# Patient Record
Sex: FEMALE | Race: WHITE | NOT HISPANIC OR LATINO | Employment: UNEMPLOYED | URBAN - METROPOLITAN AREA
[De-identification: names, ages, dates, MRNs, and addresses within clinical notes are randomized per-mention and may not be internally consistent; named-entity substitution may affect disease eponyms.]

---

## 2017-03-24 ENCOUNTER — HOSPITAL ENCOUNTER (EMERGENCY)
Facility: HOSPITAL | Age: 37
Discharge: HOME | End: 2017-03-24
Attending: EMERGENCY MEDICINE | Admitting: EMERGENCY MEDICINE

## 2017-03-24 ENCOUNTER — APPOINTMENT (OUTPATIENT)
Dept: GENERAL RADIOLOGY | Facility: HOSPITAL | Age: 37
End: 2017-03-24

## 2017-03-24 VITALS
WEIGHT: 141 LBS | SYSTOLIC BLOOD PRESSURE: 126 MMHG | OXYGEN SATURATION: 100 % | RESPIRATION RATE: 18 BRPM | HEIGHT: 60 IN | TEMPERATURE: 98.2 F | HEART RATE: 96 BPM | BODY MASS INDEX: 27.68 KG/M2 | DIASTOLIC BLOOD PRESSURE: 90 MMHG

## 2017-03-24 DIAGNOSIS — S90.32XA CONTUSION OF LEFT FOOT, INITIAL ENCOUNTER: Primary | ICD-10-CM

## 2017-03-24 PROCEDURE — 99282 EMERGENCY DEPT VISIT SF MDM: CPT | Performed by: EMERGENCY MEDICINE

## 2017-03-24 PROCEDURE — 99283 EMERGENCY DEPT VISIT LOW MDM: CPT

## 2017-03-24 PROCEDURE — 73630 X-RAY EXAM OF FOOT: CPT

## 2017-03-24 RX ORDER — NAPROXEN 375 MG/1
375 TABLET ORAL ONCE
Status: COMPLETED | OUTPATIENT
Start: 2017-03-24 | End: 2017-03-24

## 2017-03-24 RX ORDER — NAPROXEN 500 MG/1
500 TABLET ORAL 2 TIMES DAILY PRN
Qty: 30 TABLET | Refills: 0 | Status: SHIPPED | OUTPATIENT
Start: 2017-03-24 | End: 2018-05-17

## 2017-03-24 RX ADMIN — NAPROXEN 375 MG: 375 TABLET ORAL at 18:54

## 2017-03-24 NOTE — ED PROVIDER NOTES
Subjective   History of Present Illness  History of Present Illness    Chief complaint: Foot injury    Location: Left foot    Quality/Severity:  Moderate, sharp pain    Timing/Onset/Duration: Acute onset just prior to arrival    Modifying Factors: It hurts to press on the area that is sore, feels better to not    Associated Symptoms: Patient denies any numbness, tingling, or weakness, there is no abrasions or lacerations    Narrative: As 36-year-old white female had a lawn more roll off the tailgate of a truck and landed on her left foot just prior to arrival.  The patient presents pointing left foot pain.  There is no numbness, tingling, weakness, or change in color or temperature.    PCP: No known provider      Review of Systems   Musculoskeletal:        Left foot pain   Neurological: Negative for weakness and numbness.        Medication List      ASK your doctor about these medications          HYDROcodone-acetaminophen 5-325 MG per tablet   Commonly known as:  NORCO   Take 1 tablet by mouth Every 6 (Six) Hours As Needed for moderate pain   (4-6) for up to 15 doses.       promethazine 25 MG tablet   Commonly known as:  PHENERGAN   Take 1 tablet by mouth Every 6 (Six) Hours As Needed for nausea or   vomiting for up to 12 doses.           History reviewed. No pertinent past medical history.    No Known Allergies    Past Surgical History:   Procedure Laterality Date   • APPENDECTOMY     • DILATATION AND CURETTAGE     • OOPHORECTOMY         History reviewed. No pertinent family history.    Social History     Social History   • Marital status: Single     Spouse name: N/A   • Number of children: N/A   • Years of education: N/A     Social History Main Topics   • Smoking status: Current Every Day Smoker     Packs/day: 1.00   • Smokeless tobacco: None   • Alcohol use No   • Drug use: No   • Sexual activity: Not Asked     Other Topics Concern   • None     Social History Narrative   • None           Objective   Physical  Exam   Constitutional: She appears well-developed and well-nourished.   ED Triage Vitals:  Temp: 98.2 °F (36.8 °C) (03/24/17 1753)  Heart Rate: 96 (03/24/17 1753)  Resp: 18 (03/24/17 1753)  BP: 126/90 (03/24/17 1753)  SpO2: 100 % (03/24/17 1753)  Temp src: Oral (03/24/17 1753)  Heart Rate Source: n/a  Patient Position: n/a  BP Location: n/a  FiO2 (%): n/a    The patient's vitals were reviewed by me.  Unless otherwise noted they are within normal limits.     Musculoskeletal:   There is tenderness and a small contusion noted on the dorsal aspect of the left foot.  The capillary refill is less than 2 seconds.  The sensation is intact.  There is a normal range of motion no motion noted.  There is no joint laxity noted.  There is a 2 posterior cells pedis and posterior tibial pulse.  There are no lacerations or abrasions.   Neurological: She is alert.   Nursing note and vitals reviewed.      Procedures         ED Course  ED Course      6:40 PM, 03/24/17:  Patient's diagnosis of foot contusion was discussed with her.  She has been advised to ice the affected area for 20 minutes every 2 hours while she is awake for 2-3 days.  She's been advised to elevate the foot.  She has been given a follow-up appointment with a podiatrist to follow-up with in one week if she is not completely better.  She has been instructed to return if she has increasing pain, numbness, tingling, weakness, change in color or temperature, worse in any way at all.  Proper something for the pain.  All of the patient's questions were answered she will be discharged in good condition.            MDM  XR Foot 3+ View Left    (Results Pending)     Labs Reviewed - No data to display  No results found.    Final diagnoses:   None         ED Medications:  Medications - No data to display    New Medications:     Medication List      ASK your doctor about these medications          HYDROcodone-acetaminophen 5-325 MG per tablet   Commonly known as:  NORCO   Take 1  tablet by mouth Every 6 (Six) Hours As Needed for moderate pain   (4-6) for up to 15 doses.       promethazine 25 MG tablet   Commonly known as:  PHENERGAN   Take 1 tablet by mouth Every 6 (Six) Hours As Needed for nausea or   vomiting for up to 12 doses.           Stopped Medications:     Medication List      ASK your doctor about these medications          HYDROcodone-acetaminophen 5-325 MG per tablet   Commonly known as:  NORCO   Take 1 tablet by mouth Every 6 (Six) Hours As Needed for moderate pain   (4-6) for up to 15 doses.       promethazine 25 MG tablet   Commonly known as:  PHENERGAN   Take 1 tablet by mouth Every 6 (Six) Hours As Needed for nausea or   vomiting for up to 12 doses.             Final diagnoses:   Contusion of left foot, initial encounter            Isaiah Doan MD  03/24/17 5220

## 2017-03-24 NOTE — DISCHARGE INSTRUCTIONS
Called Dr. Salguero for follow-up appointment in 1 week if not completely better.  Return to the emergency department if you have increasing pain, numbness, tingling, weakness, change in color or temperature, worse in any way at all.

## 2018-05-17 ENCOUNTER — APPOINTMENT (OUTPATIENT)
Dept: GENERAL RADIOLOGY | Facility: HOSPITAL | Age: 38
End: 2018-05-17

## 2018-05-17 ENCOUNTER — HOSPITAL ENCOUNTER (EMERGENCY)
Facility: HOSPITAL | Age: 38
Discharge: HOME | End: 2018-05-17
Attending: EMERGENCY MEDICINE | Admitting: EMERGENCY MEDICINE

## 2018-05-17 VITALS
DIASTOLIC BLOOD PRESSURE: 83 MMHG | SYSTOLIC BLOOD PRESSURE: 150 MMHG | OXYGEN SATURATION: 98 % | HEIGHT: 60 IN | WEIGHT: 150 LBS | BODY MASS INDEX: 29.45 KG/M2 | RESPIRATION RATE: 16 BRPM | HEART RATE: 73 BPM | TEMPERATURE: 97.7 F

## 2018-05-17 DIAGNOSIS — R07.89 ATYPICAL CHEST PAIN: Primary | ICD-10-CM

## 2018-05-17 LAB
ALBUMIN SERPL-MCNC: 3.9 G/DL (ref 3.5–5.2)
ALBUMIN/GLOB SERPL: 1.5 G/DL
ALP SERPL-CCNC: 13 U/L (ref 40–129)
ALT SERPL W P-5'-P-CCNC: 15 U/L (ref 5–33)
ANION GAP SERPL CALCULATED.3IONS-SCNC: 11.4 MMOL/L
APTT PPP: 33.2 SECONDS (ref 24.3–38.1)
AST SERPL-CCNC: 48 U/L (ref 5–32)
BASOPHILS # BLD AUTO: 0.02 10*3/MM3 (ref 0–0.2)
BASOPHILS NFR BLD AUTO: 0.2 % (ref 0–2)
BILIRUB SERPL-MCNC: 0.4 MG/DL (ref 0.2–1.2)
BUN BLD-MCNC: 11 MG/DL (ref 6–20)
BUN/CREAT SERPL: 11.5 (ref 7–25)
CALCIUM SPEC-SCNC: 9.7 MG/DL (ref 8.6–10.5)
CHLORIDE SERPL-SCNC: 103 MMOL/L (ref 98–107)
CO2 SERPL-SCNC: 24.6 MMOL/L (ref 22–29)
CREAT BLD-MCNC: 0.96 MG/DL (ref 0.57–1)
D DIMER PPP FEU-MCNC: <0.3 MCGFEU/ML (ref 0–0.46)
DEPRECATED RDW RBC AUTO: 42 FL (ref 37–54)
EOSINOPHIL # BLD AUTO: 0.2 10*3/MM3 (ref 0.1–0.3)
EOSINOPHIL NFR BLD AUTO: 2.3 % (ref 0–4)
ERYTHROCYTE [DISTWIDTH] IN BLOOD BY AUTOMATED COUNT: 12.8 % (ref 11.5–14.5)
GFR SERPL CREATININE-BSD FRML MDRD: 66 ML/MIN/1.73
GLOBULIN UR ELPH-MCNC: 2.6 GM/DL
GLUCOSE BLD-MCNC: 111 MG/DL (ref 65–99)
HCG SERPL QL: NEGATIVE
HCT VFR BLD AUTO: 39.4 % (ref 37–47)
HGB BLD-MCNC: 13.3 G/DL (ref 12–16)
IMM GRANULOCYTES # BLD: 0.02 10*3/MM3 (ref 0–0.03)
IMM GRANULOCYTES NFR BLD: 0.2 % (ref 0–0.5)
INR PPP: 1.06 (ref 0.9–1.1)
LIPASE SERPL-CCNC: 18 U/L (ref 13–60)
LYMPHOCYTES # BLD AUTO: 2.93 10*3/MM3 (ref 0.6–4.8)
LYMPHOCYTES NFR BLD AUTO: 34.1 % (ref 20–45)
MCH RBC QN AUTO: 30.7 PG (ref 27–31)
MCHC RBC AUTO-ENTMCNC: 33.8 G/DL (ref 31–37)
MCV RBC AUTO: 91 FL (ref 81–99)
MONOCYTES # BLD AUTO: 0.74 10*3/MM3 (ref 0–1)
MONOCYTES NFR BLD AUTO: 8.6 % (ref 3–8)
NEUTROPHILS # BLD AUTO: 4.68 10*3/MM3 (ref 1.5–8.3)
NEUTROPHILS NFR BLD AUTO: 54.6 % (ref 45–70)
NRBC BLD MANUAL-RTO: 0 /100 WBC (ref 0–0)
PLATELET # BLD AUTO: 270 10*3/MM3 (ref 140–500)
PMV BLD AUTO: 9.5 FL (ref 7.4–10.4)
POTASSIUM BLD-SCNC: 3.9 MMOL/L (ref 3.5–5.2)
PROT SERPL-MCNC: 6.5 G/DL (ref 6–8.5)
PROTHROMBIN TIME: 13.8 SECONDS (ref 12.1–15)
RBC # BLD AUTO: 4.33 10*6/MM3 (ref 4.2–5.4)
SODIUM BLD-SCNC: 139 MMOL/L (ref 136–145)
TROPONIN T SERPL-MCNC: <0.01 NG/ML (ref 0–0.03)
TROPONIN T SERPL-MCNC: <0.01 NG/ML (ref 0–0.03)
WBC NRBC COR # BLD: 8.59 10*3/MM3 (ref 4.8–10.8)

## 2018-05-17 PROCEDURE — 99284 EMERGENCY DEPT VISIT MOD MDM: CPT

## 2018-05-17 PROCEDURE — 83690 ASSAY OF LIPASE: CPT | Performed by: EMERGENCY MEDICINE

## 2018-05-17 PROCEDURE — 85730 THROMBOPLASTIN TIME PARTIAL: CPT | Performed by: EMERGENCY MEDICINE

## 2018-05-17 PROCEDURE — 85610 PROTHROMBIN TIME: CPT | Performed by: EMERGENCY MEDICINE

## 2018-05-17 PROCEDURE — 84703 CHORIONIC GONADOTROPIN ASSAY: CPT | Performed by: EMERGENCY MEDICINE

## 2018-05-17 PROCEDURE — 85025 COMPLETE CBC W/AUTO DIFF WBC: CPT | Performed by: EMERGENCY MEDICINE

## 2018-05-17 PROCEDURE — 71046 X-RAY EXAM CHEST 2 VIEWS: CPT

## 2018-05-17 PROCEDURE — 25010000002 KETOROLAC TROMETHAMINE PER 15 MG: Performed by: EMERGENCY MEDICINE

## 2018-05-17 PROCEDURE — 80053 COMPREHEN METABOLIC PANEL: CPT | Performed by: EMERGENCY MEDICINE

## 2018-05-17 PROCEDURE — 99284 EMERGENCY DEPT VISIT MOD MDM: CPT | Performed by: EMERGENCY MEDICINE

## 2018-05-17 PROCEDURE — 96374 THER/PROPH/DIAG INJ IV PUSH: CPT

## 2018-05-17 PROCEDURE — 85379 FIBRIN DEGRADATION QUANT: CPT | Performed by: EMERGENCY MEDICINE

## 2018-05-17 PROCEDURE — 25010000002 MORPHINE (PF) 10 MG/ML SOLUTION

## 2018-05-17 PROCEDURE — 93010 ELECTROCARDIOGRAM REPORT: CPT | Performed by: INTERNAL MEDICINE

## 2018-05-17 PROCEDURE — 93005 ELECTROCARDIOGRAM TRACING: CPT | Performed by: EMERGENCY MEDICINE

## 2018-05-17 PROCEDURE — 84484 ASSAY OF TROPONIN QUANT: CPT | Performed by: EMERGENCY MEDICINE

## 2018-05-17 RX ORDER — MORPHINE SULFATE 2 MG/ML
2 INJECTION, SOLUTION INTRAMUSCULAR; INTRAVENOUS ONCE
Status: DISCONTINUED | OUTPATIENT
Start: 2018-05-17 | End: 2018-05-17 | Stop reason: HOSPADM

## 2018-05-17 RX ORDER — RANITIDINE 150 MG/1
150 CAPSULE ORAL 2 TIMES DAILY
Qty: 20 CAPSULE | Refills: 0 | Status: SHIPPED | OUTPATIENT
Start: 2018-05-17 | End: 2018-05-27

## 2018-05-17 RX ORDER — DIPHENHYDRAMINE HYDROCHLORIDE 50 MG/ML
25 INJECTION INTRAMUSCULAR; INTRAVENOUS ONCE
Status: DISCONTINUED | OUTPATIENT
Start: 2018-05-17 | End: 2018-05-17 | Stop reason: HOSPADM

## 2018-05-17 RX ORDER — NAPROXEN 500 MG/1
500 TABLET ORAL 2 TIMES DAILY WITH MEALS
Qty: 20 TABLET | Refills: 0 | Status: SHIPPED | OUTPATIENT
Start: 2018-05-17 | End: 2018-05-27

## 2018-05-17 RX ORDER — SODIUM CHLORIDE 0.9 % (FLUSH) 0.9 %
10 SYRINGE (ML) INJECTION AS NEEDED
Status: DISCONTINUED | OUTPATIENT
Start: 2018-05-17 | End: 2018-05-17 | Stop reason: HOSPADM

## 2018-05-17 RX ORDER — MORPHINE SULFATE 10 MG/ML
INJECTION, SOLUTION INTRAMUSCULAR; INTRAVENOUS
Status: COMPLETED
Start: 2018-05-17 | End: 2018-05-17

## 2018-05-17 RX ORDER — KETOROLAC TROMETHAMINE 30 MG/ML
15 INJECTION, SOLUTION INTRAMUSCULAR; INTRAVENOUS ONCE
Status: COMPLETED | OUTPATIENT
Start: 2018-05-17 | End: 2018-05-17

## 2018-05-17 RX ADMIN — KETOROLAC TROMETHAMINE 15 MG: 30 INJECTION INTRAMUSCULAR; INTRAVENOUS at 09:01

## 2018-05-17 RX ADMIN — MORPHINE SULFATE 2 MG: 10 INJECTION INTRAVENOUS at 10:52

## 2018-05-17 NOTE — ED PROVIDER NOTES
EMERGENCY DEPARTMENT ENCOUNTER      Room Number: 2/02      HPI:    Chief complaint: Shortness of breath and chest pain    Location: Lower sternal pain.     Quality/Severity:  Described as heaviness and moderate severity    Timing/Duration: Symptoms began 20 minutes prior to arrival    Modifying Factors: None    Associated Symptoms: Anxiety    Narrative: Pt is a 38 y.o. female who presents complaining of chest pain and shortness of breath as noted above.  The patient relates that she was driving when she had the sudden onset of chest heaviness associated with shortness of breath.  No family history of coronary artery disease.  No history of high cholesterol levels, diabetes or hypertension.  The patient is a smoker.  Denies any inciting events or recent illnesses.  No known trauma.      PMD: No Known Provider    REVIEW OF SYSTEMS  Review of Systems   Constitutional: Negative for activity change, appetite change, fatigue and fever.   HENT: Negative for congestion.    Respiratory: Positive for shortness of breath. Negative for cough and wheezing.    Cardiovascular: Positive for chest pain. Negative for palpitations and leg swelling.   Gastrointestinal: Negative for abdominal pain, diarrhea, nausea and vomiting.   Endocrine: Negative for polydipsia.   Genitourinary: Negative for difficulty urinating, dysuria, flank pain, frequency and urgency.   Musculoskeletal: Negative for back pain.   Skin: Negative for rash.   Neurological: Negative for dizziness, weakness and headaches.   Psychiatric/Behavioral: Negative for confusion.       PAST MEDICAL HISTORY  Active Ambulatory Problems     Diagnosis Date Noted   • No Active Ambulatory Problems     Resolved Ambulatory Problems     Diagnosis Date Noted   • No Resolved Ambulatory Problems     No Additional Past Medical History       PAST SURGICAL HISTORY  Past Surgical History:   Procedure Laterality Date   • APPENDECTOMY     • DILATATION AND CURETTAGE     • OOPHORECTOMY          FAMILY HISTORY  History reviewed. No pertinent family history.    SOCIAL HISTORY  Social History     Social History   • Marital status: Single     Spouse name: N/A   • Number of children: N/A   • Years of education: N/A     Occupational History   • Not on file.     Social History Main Topics   • Smoking status: Current Every Day Smoker     Packs/day: 1.00   • Smokeless tobacco: Not on file   • Alcohol use No   • Drug use: No   • Sexual activity: Not on file     Other Topics Concern   • Not on file     Social History Narrative   • No narrative on file       ALLERGIES  Patient has no known allergies.    PHYSICAL EXAM  ED Triage Vitals My differential diagnosis for chest pain includes but is not limited to:  Muscle strain, costochondritis, myositis, pleurisy, rib fracture, intercostal neuritis, herpes zoster, tumor, myocardial infarction, coronary syndrome, unstable angina, angina, aortic dissection, mitral valve prolapse, pericarditis, palpitations, pulmonary embolus, pneumonia, pneumothorax, lung cancer, GERD, esophagitis, esophageal spasm     Temp Heart Rate Resp BP SpO2   97.9 °F (36.6 °C) 79 24 (!) 134/108 99 %      Temp src Heart Rate Source Patient Position BP Location FiO2 (%)   -- Monitor Lying Right arm --       Physical Exam   Constitutional: She is oriented to person, place, and time.   The patient is a normally developed, 38-year-old, white female who appears anxious and is obviously hyperventilating   HENT:   Head: Normocephalic and atraumatic.   Eyes: Conjunctivae and EOM are normal.   Neck: Normal range of motion. No thyromegaly present.   Cardiovascular: Normal rate, regular rhythm and normal heart sounds.    Pulmonary/Chest: She has no wheezes. She has no rales. She exhibits no tenderness.   Patient hyperventilating   Abdominal: Soft. Bowel sounds are normal. She exhibits no distension. There is no tenderness.   Neurological: She is alert and oriented to person, place, and time.   Skin: Skin is  warm and dry.   Psychiatric:   Patient is anxious and nearly tearful.   Nursing note and vitals reviewed.      LAB RESULTS  Results for orders placed or performed during the hospital encounter of 05/17/18   Comprehensive Metabolic Panel   Result Value Ref Range    Glucose 111 (H) 65 - 99 mg/dL    BUN 11 6 - 20 mg/dL    Creatinine 0.96 0.57 - 1.00 mg/dL    Sodium 139 136 - 145 mmol/L    Potassium 3.9 3.5 - 5.2 mmol/L    Chloride 103 98 - 107 mmol/L    CO2 24.6 22.0 - 29.0 mmol/L    Calcium 9.7 8.6 - 10.5 mg/dL    Total Protein 6.5 6.0 - 8.5 g/dL    Albumin 3.90 3.50 - 5.20 g/dL    ALT (SGPT) 15 5 - 33 U/L    AST (SGOT) 48 (H) 5 - 32 U/L    Alkaline Phosphatase 13 (L) 40 - 129 U/L    Total Bilirubin 0.4 0.2 - 1.2 mg/dL    eGFR Non African Amer 66 >60 mL/min/1.73    Globulin 2.6 gm/dL    A/G Ratio 1.5 g/dL    BUN/Creatinine Ratio 11.5 7.0 - 25.0    Anion Gap 11.4 mmol/L   Protime-INR   Result Value Ref Range    Protime 13.8 12.1 - 15.0 Seconds    INR 1.06 0.90 - 1.10   aPTT   Result Value Ref Range    PTT 33.2 24.3 - 38.1 seconds   Lipase   Result Value Ref Range    Lipase 18 13 - 60 U/L   D-dimer, Quantitative   Result Value Ref Range    D-Dimer, Quantitative <0.30 0.00 - 0.46 MCGFEU/mL   Troponin   Result Value Ref Range    Troponin T <0.010 0.000 - 0.030 ng/mL   CBC Auto Differential   Result Value Ref Range    WBC 8.59 4.80 - 10.80 10*3/mm3    RBC 4.33 4.20 - 5.40 10*6/mm3    Hemoglobin 13.3 12.0 - 16.0 g/dL    Hematocrit 39.4 37.0 - 47.0 %    MCV 91.0 81.0 - 99.0 fL    MCH 30.7 27.0 - 31.0 pg    MCHC 33.8 31.0 - 37.0 g/dL    RDW 12.8 11.5 - 14.5 %    RDW-SD 42.0 37.0 - 54.0 fl    MPV 9.5 7.4 - 10.4 fL    Platelets 270 140 - 500 10*3/mm3    Neutrophil % 54.6 45.0 - 70.0 %    Lymphocyte % 34.1 20.0 - 45.0 %    Monocyte % 8.6 (H) 3.0 - 8.0 %    Eosinophil % 2.3 0.0 - 4.0 %    Basophil % 0.2 0.0 - 2.0 %    Immature Grans % 0.2 0.0 - 0.5 %    Neutrophils, Absolute 4.68 1.50 - 8.30 10*3/mm3    Lymphocytes, Absolute 2.93  0.60 - 4.80 10*3/mm3    Monocytes, Absolute 0.74 0.00 - 1.00 10*3/mm3    Eosinophils, Absolute 0.20 0.10 - 0.30 10*3/mm3    Basophils, Absolute 0.02 0.00 - 0.20 10*3/mm3    Immature Grans, Absolute 0.02 0.00 - 0.03 10*3/mm3    nRBC 0.0 0.0 - 0.0 /100 WBC   hCG, Serum, Qualitative   Result Value Ref Range    HCG Qualitative Negative Negative   Troponin   Result Value Ref Range    Troponin T <0.010 0.000 - 0.030 ng/mL         I ordered the above labs and reviewed the results    RADIOLOGY  Xr Chest 2 View    Result Date: 5/17/2018  Narrative: CHEST X-RAY, 5/17/2018  HISTORY: 38-year-old female in the ED complaining of new onset chest pain this morning.  TECHNIQUE: PA and lateral upright chest series.  FINDINGS: The examination is negative. Heart size and pulmonary vascularity are normal. The lungs are expanded and clear. No visible pulmonary infiltrate, pneumothorax or pleural effusion.      Impression: Negative chest.  This report was finalized on 5/17/2018 9:23 AM by Dr. Jorge Spence MD.        I ordered the above radiologic testing and reviewed the results    PROCEDURES  Procedures      PROGRESS AND CONSULTS  ED Course as of May 17 1142   Thu May 17, 2018   0854 EKG was reviewed at 0841 hours and showed a normal sinus rhythm with a rate of 78 bpm.  ST segments and T waves unremarkable.  No ectopy.  Unremarkable ECG.  [ML]   1041 Patient updated on negative results to date and she is still complaining of pain.  Repeat troponin will be obtained and the patient's pain will be addressed.  [ML]   1136 Repeat troponin negative and related to patient.  Etiology of the patient's pain is unclear.  Possibilities include GERD with esophageal spasm, pleurisy or chest wall pain.  Patient to be treated symptomatically.  Treatment plan, expectations and warnings discussed at length with patient and .  [ML]      ED Course User Index  [ML] Marcelo Lopes MD           MEDICAL DECISION MAKING  Results were  reviewed/discussed with the patient and they were also made aware of online access. Pt also made aware that some labs, such as cultures, will not be resulted during ER visit and follow up with PMD is necessary.     MDM  Number of Diagnoses or Management Options  Atypical chest pain: new and requires workup     Amount and/or Complexity of Data Reviewed  Clinical lab tests: ordered and reviewed  Tests in the radiology section of CPT®: ordered and reviewed  Independent visualization of images, tracings, or specimens: yes    Risk of Complications, Morbidity, and/or Mortality  Presenting problems: high  Diagnostic procedures: high  Management options: moderate    Patient Progress  Patient progress: improved         DIAGNOSIS  Final diagnoses:   Atypical chest pain       Latest Documented Vital Signs:  As of 11:42 AM  BP- 129/82 HR- 71 Temp- 97.9 °F (36.6 °C) O2 sat- 99%    DISPOSITION  Patient discharged home with .       Medication List      New Prescriptions    ranitidine 150 MG capsule  Commonly known as:  ZANTAC  Take 1 capsule by mouth 2 (Two) Times a Day for 10 days.        Changed    naproxen 500 MG tablet  Commonly known as:  NAPROSYN  Take 1 tablet by mouth 2 (Two) Times a Day With Meals for 10 days.  What changed:  when to take this  reasons to take this        Stop    HYDROcodone-acetaminophen 5-325 MG per tablet  Commonly known as:  NORCO     promethazine 25 MG tablet  Commonly known as:  PHENERGAN          Follow-up Information     Your doctor In 1 week.    Why:  If symptoms worsen                    Marcelo Lopes MD  05/17/18 0145

## 2019-06-06 ENCOUNTER — HOSPITAL ENCOUNTER (EMERGENCY)
Facility: HOSPITAL | Age: 39
Discharge: HOME | End: 2019-06-06
Attending: EMERGENCY MEDICINE | Admitting: EMERGENCY MEDICINE

## 2019-06-06 VITALS
WEIGHT: 140 LBS | TEMPERATURE: 99 F | DIASTOLIC BLOOD PRESSURE: 95 MMHG | OXYGEN SATURATION: 98 % | HEART RATE: 99 BPM | BODY MASS INDEX: 27.48 KG/M2 | SYSTOLIC BLOOD PRESSURE: 136 MMHG | RESPIRATION RATE: 16 BRPM | HEIGHT: 60 IN

## 2019-06-06 DIAGNOSIS — J02.9 PHARYNGITIS, UNSPECIFIED ETIOLOGY: Primary | ICD-10-CM

## 2019-06-06 LAB — S PYO AG THROAT QL: NEGATIVE

## 2019-06-06 PROCEDURE — 99283 EMERGENCY DEPT VISIT LOW MDM: CPT

## 2019-06-06 PROCEDURE — 99282 EMERGENCY DEPT VISIT SF MDM: CPT | Performed by: PHYSICIAN ASSISTANT

## 2019-06-06 PROCEDURE — 87081 CULTURE SCREEN ONLY: CPT | Performed by: EMERGENCY MEDICINE

## 2019-06-06 PROCEDURE — 87880 STREP A ASSAY W/OPTIC: CPT | Performed by: EMERGENCY MEDICINE

## 2019-06-06 RX ORDER — METHYLPREDNISOLONE 4 MG/1
TABLET ORAL
Qty: 21 TABLET | Refills: 0 | Status: SHIPPED | OUTPATIENT
Start: 2019-06-06 | End: 2020-10-30

## 2019-06-06 NOTE — ED PROVIDER NOTES
"Subjective   History of Present Illness  History of Present Illness    Chief complaint: Sore throat.    Location: Bilateral throat    Quality/Severity: \"Sore\".  Severe    Timing/Duration: 2 weeks.    Modifying Factors: Swallowing makes worse.  Nothing makes better.  Patient has not tried taking anything for the pain.    Associated Symptoms: Denies fevers or chills.  Denies rhinorrhea.  Denies nasal congestion.  Denies ear pain.  Denies cough.  Denies chest pain or shortness of breath.  Denies headache.    Narrative: 39-year-old female presents with sore throat as above.  She denies any sick contacts.    Review of Systems  General: Denies fevers or chills.  Denies any weakness or fatigue.  Denies any weight loss or weight gain.  SKIN: Denies any rashes lesions or ulcers.  Denies color change.    HEENT:  Denies any change in vision.  Sore throat as above.  Denies ear pain.  Denies nasal congestion.  Denies rhinorrhea.  Denies allergies.    LUNGS: Denies any shortness of breath or wheezing.    CARDIAC: Denies any chest pain.  Denies palpitations.  Denies syncope.  Denies any edema  ABD: Denies any abdominal pain.  Denies any nausea or vomiting or diarrhea.    : Denies any dysuria, urgency, frequency or hematuria.    NEURO: Denies any weakness.  Denies headache.  Denies seizures.  Denies changes in speech or difficulty walking.  M/S: Denies arthralgias, back pain, myalgias or neck pain  PSYCH: Negative for suicidal ideas. Denies anxiety or depression   review was performed in addition to those in the above all other reviews are negative.    History reviewed. No pertinent past medical history.    No Known Allergies    Past Surgical History:   Procedure Laterality Date   • APPENDECTOMY     • DILATATION AND CURETTAGE     • OOPHORECTOMY         History reviewed. No pertinent family history.    Social History     Socioeconomic History   • Marital status: Single     Spouse name: Not on file   • Number of children: Not on " file   • Years of education: Not on file   • Highest education level: Not on file   Tobacco Use   • Smoking status: Current Every Day Smoker     Packs/day: 1.00   Substance and Sexual Activity   • Alcohol use: No   • Drug use: No     No current facility-administered medications for this encounter.   No current outpatient medications on file.        Objective   Physical Exam  Vitals:    06/06/19 1227   BP: 136/95   Pulse: 99   Resp: 16   Temp: 99 °F (37.2 °C)   SpO2: 98%     GENERAL: Alert and oriented ×4.  No apparent distress.  SKIN: Warm, pink and dry  HEENT: Atraumatic normocephalic.  TMs clear bilaterally with good cone of light.  Oropharynx clear with mild pharyngeal erythema and edema.  No uvular deviation or edema.  No abscess noted.  Voice is normal.  No lymphadenopathy. nares clear.  LUNGS: Clear to auscultation bilaterally without wheezes, rales or rhonchi  CARDIAC: Regular rate and rhythm.  S1 and S2.  No murmurs, rubs or gallops.  ABD: Soft, nontender  M/S: MAEW, no deformity  PSYCH: Flat    Procedures           ED Course      Results for orders placed or performed during the hospital encounter of 06/06/19   Rapid Strep A Screen - Swab, Throat   Result Value Ref Range    Strep A Ag Negative Negative      Offered patient steroids for the mild swelling.  Will discharge with Medrol Dosepak.  Educated on salt water gargle.  Follow-up with primary care.  ENT as needed.    Discussed pertinent labs and imaging findings with the patient/family.  Patient/Family voiced understanding of need to follow-up for recheck, further testing as needed.  Return to the emergency Department warnings were given.              MDM  Number of Diagnoses or Management Options  Pharyngitis, unspecified etiology: new and requires workup     Amount and/or Complexity of Data Reviewed  Clinical lab tests: reviewed and ordered  Tests in the medicine section of CPT®: ordered and reviewed    Risk of Complications, Morbidity, and/or  Mortality  Presenting problems: low  Diagnostic procedures: low  Management options: low    Patient Progress  Patient progress: improved    Differentials include, but not limited to: Strep, allergic rhinitis, postnasal drip, mono, peritonisilar/ tonisilar abscess    Final diagnoses:   Pharyngitis, unspecified etiology     Dictated utilizing Dragon dictation         Alyson Wu PA-C  06/06/19 9310

## 2019-06-09 LAB — BACTERIA SPEC AEROBE CULT: NORMAL

## 2019-09-17 ENCOUNTER — APPOINTMENT (OUTPATIENT)
Dept: GENERAL RADIOLOGY | Facility: HOSPITAL | Age: 39
End: 2019-09-17

## 2019-09-17 ENCOUNTER — HOSPITAL ENCOUNTER (EMERGENCY)
Facility: HOSPITAL | Age: 39
Discharge: HOME | End: 2019-09-17
Attending: EMERGENCY MEDICINE | Admitting: EMERGENCY MEDICINE

## 2019-09-17 VITALS
RESPIRATION RATE: 16 BRPM | OXYGEN SATURATION: 97 % | SYSTOLIC BLOOD PRESSURE: 151 MMHG | DIASTOLIC BLOOD PRESSURE: 110 MMHG | BODY MASS INDEX: 27.48 KG/M2 | WEIGHT: 140 LBS | HEIGHT: 60 IN | HEART RATE: 106 BPM | TEMPERATURE: 98.3 F

## 2019-09-17 DIAGNOSIS — S93.402A SPRAIN OF LEFT ANKLE, UNSPECIFIED LIGAMENT, INITIAL ENCOUNTER: ICD-10-CM

## 2019-09-17 DIAGNOSIS — R03.0 ELEVATED BLOOD PRESSURE READING: Primary | ICD-10-CM

## 2019-09-17 PROCEDURE — 99283 EMERGENCY DEPT VISIT LOW MDM: CPT

## 2019-09-17 PROCEDURE — 99282 EMERGENCY DEPT VISIT SF MDM: CPT | Performed by: EMERGENCY MEDICINE

## 2019-09-17 PROCEDURE — 73610 X-RAY EXAM OF ANKLE: CPT

## 2019-09-17 NOTE — ED PROVIDER NOTES
Subjective   History of Present Illness  History of Present Illness    Chief complaint: Ankle pain    Location: Left ankle    Quality/Severity: Moderate, sharp    Timing/Onset/Duration: Acute onset 1 month ago    Modifying Factors: It hurts to bear weight, feels better to keep weight off of    Associated Symptoms: Patient has swelling.  No numbness, tingling, or weakness.  No other complaints.    Narrative: 39-year-old white female injured her left ankle riding a horse 1 month ago.  She presents with continued pain and swelling.  She has not been seen by provider to have the ankle injury evaluated.    PCP: No known provider      Review of Systems   Musculoskeletal:        Left ankle pain and swelling   Neurological: Negative for weakness and numbness.        Medication List      ASK your doctor about these medications    methylPREDNISolone 4 MG tablet  Commonly known as:  MEDROL (SADIA)  Take as directed on package instructions.          History reviewed. No pertinent past medical history.    No Known Allergies    Past Surgical History:   Procedure Laterality Date   • APPENDECTOMY     • DILATATION AND CURETTAGE     • OOPHORECTOMY         History reviewed. No pertinent family history.    Social History     Socioeconomic History   • Marital status: Single     Spouse name: Not on file   • Number of children: Not on file   • Years of education: Not on file   • Highest education level: Not on file   Tobacco Use   • Smoking status: Current Every Day Smoker     Packs/day: 1.00   Substance and Sexual Activity   • Alcohol use: No   • Drug use: No           Objective   Physical Exam   Constitutional:   ED Triage Vitals (09/17/19 1229)  Temp: 98.3 °F (36.8 °C)  Heart Rate: 106  Resp: 16  BP: (!) 151/110  SpO2: 97 %  Temp src: Oral  Heart Rate Source: Monitor  Patient Position: Sitting  BP Location: Right arm  FiO2 (%): n/a    The patient's vitals were reviewed by me.  Unless otherwise noted they are within normal limits.  The  patient is hypertensive with a blood pressure 151/110.     Musculoskeletal:   There is swelling and tenderness upon palpation of the left lateral malleolus.  The capillary refill is less than 2 seconds.  The sensation is intact.  There is decreased range of motion secondary to pain.  There is no joint laxity noted.  There is 2+ dorsalis pedis and posterior tibial pulse.  There is no proximal fibular tenderness.  The left lower extremity is otherwise without tenderness or deformity and neurovascularly intact.       Neurological: She is alert.   Skin: Skin is warm and dry. Capillary refill takes less than 2 seconds. No pallor.   Nursing note and vitals reviewed.      Procedures           ED Course        1:46 PM, 09/17/19:  Ankle stirrup was applied to the left ankle by the technician.  After application of the splint it was assessed by me and noted to be in good position with the left lower extremity being neurovascularly intact.  Patient was fitted for crutches by the technician and given crutch instruction.    1:41 PM, 09/17/19:  The patient's diagnosis of ankle sprain was discussed with her the patient should not bear weight on the left ankle.  She should follow-up with Dr. Tsai this week.  He should return to the emergency department if there is increased pain, numbness, tingling, weakness, change in color or temperature, worse in any way at all.  The patient should take Motrin or Tylenol as needed as directed for pain.  Patient should call a physician from the physician referral list for primary care provider to follow-up for her blood pressure within 1 week.  All the patient's questions were answered the patient will be discharged in good condition.          MDM    Final diagnoses:   Elevated blood pressure reading   Sprain of left ankle, unspecified ligament, initial encounter              Isaiah Doan MD  09/17/19 1368

## 2019-09-17 NOTE — DISCHARGE INSTRUCTIONS
Nonweightbearing left ankle.  Follow-up with Dr. Tsai this week.  Take Motrin or Tylenol as needed as directed for pain.  Call a physician from the physician referral list for primary care provider to follow-up with within 1 week for your blood pressure.  Return to the emergency department there is increased pain, numbness, tingling, weakness, change in color or temperature, worse in any way at all.

## 2019-09-18 ENCOUNTER — TELEPHONE (OUTPATIENT)
Dept: ORTHOPEDIC SURGERY | Facility: CLINIC | Age: 39
End: 2019-09-18

## 2019-11-27 ENCOUNTER — OFFICE VISIT (OUTPATIENT)
Dept: ORTHOPEDIC SURGERY | Facility: CLINIC | Age: 39
End: 2019-11-27

## 2019-11-27 VITALS
SYSTOLIC BLOOD PRESSURE: 150 MMHG | HEART RATE: 104 BPM | HEIGHT: 60 IN | WEIGHT: 180 LBS | BODY MASS INDEX: 35.34 KG/M2 | DIASTOLIC BLOOD PRESSURE: 94 MMHG

## 2019-11-27 DIAGNOSIS — R52 PAIN: Primary | ICD-10-CM

## 2019-11-27 DIAGNOSIS — S99.912A LEFT ANKLE INJURY, INITIAL ENCOUNTER: ICD-10-CM

## 2019-11-27 PROCEDURE — 99203 OFFICE O/P NEW LOW 30 MIN: CPT | Performed by: NURSE PRACTITIONER

## 2019-11-27 PROCEDURE — 73610 X-RAY EXAM OF ANKLE: CPT | Performed by: NURSE PRACTITIONER

## 2019-11-27 RX ORDER — DIAZEPAM 5 MG/1
TABLET ORAL
Qty: 2 TABLET | Refills: 0 | Status: SHIPPED | OUTPATIENT
Start: 2019-11-27 | End: 2020-10-30

## 2019-11-27 RX ORDER — MELOXICAM 15 MG/1
15 TABLET ORAL DAILY
Qty: 30 TABLET | Refills: 2 | Status: SHIPPED | OUTPATIENT
Start: 2019-11-27 | End: 2020-10-30

## 2019-11-27 RX ORDER — IBUPROFEN 200 MG
200 TABLET ORAL EVERY 6 HOURS PRN
COMMUNITY
End: 2021-05-27

## 2019-12-09 ENCOUNTER — HOSPITAL ENCOUNTER (EMERGENCY)
Facility: HOSPITAL | Age: 39
Discharge: HOME | End: 2019-12-09
Attending: EMERGENCY MEDICINE | Admitting: EMERGENCY MEDICINE

## 2019-12-09 ENCOUNTER — APPOINTMENT (OUTPATIENT)
Dept: CT IMAGING | Facility: HOSPITAL | Age: 39
End: 2019-12-09

## 2019-12-09 VITALS
HEIGHT: 60 IN | BODY MASS INDEX: 31.41 KG/M2 | WEIGHT: 160 LBS | HEART RATE: 82 BPM | OXYGEN SATURATION: 98 % | RESPIRATION RATE: 16 BRPM | SYSTOLIC BLOOD PRESSURE: 112 MMHG | TEMPERATURE: 98.3 F | DIASTOLIC BLOOD PRESSURE: 77 MMHG

## 2019-12-09 DIAGNOSIS — R10.9 LEFT FLANK PAIN: ICD-10-CM

## 2019-12-09 DIAGNOSIS — M54.50 ACUTE LEFT-SIDED LOW BACK PAIN WITHOUT SCIATICA: Primary | ICD-10-CM

## 2019-12-09 LAB
ALBUMIN SERPL-MCNC: 3.7 G/DL (ref 3.5–5.2)
ALBUMIN/GLOB SERPL: 1.2 G/DL
ALP SERPL-CCNC: 14 U/L (ref 39–117)
ALT SERPL W P-5'-P-CCNC: 9 U/L (ref 1–33)
ANION GAP SERPL CALCULATED.3IONS-SCNC: 10.3 MMOL/L (ref 5–15)
AST SERPL-CCNC: 14 U/L (ref 1–32)
B-HCG UR QL: NEGATIVE
BACTERIA UR QL AUTO: ABNORMAL /HPF
BASOPHILS # BLD AUTO: 0.05 10*3/MM3 (ref 0–0.2)
BASOPHILS NFR BLD AUTO: 0.7 % (ref 0–1.5)
BILIRUB SERPL-MCNC: 0.2 MG/DL (ref 0.2–1.2)
BILIRUB UR QL STRIP: NEGATIVE
BUN BLD-MCNC: 12 MG/DL (ref 6–20)
BUN/CREAT SERPL: 17.1 (ref 7–25)
CALCIUM SPEC-SCNC: 8.9 MG/DL (ref 8.6–10.5)
CHLORIDE SERPL-SCNC: 104 MMOL/L (ref 98–107)
CLARITY UR: CLEAR
CO2 SERPL-SCNC: 24.7 MMOL/L (ref 22–29)
COD CRY URNS QL: ABNORMAL /HPF
COLOR UR: YELLOW
CREAT BLD-MCNC: 0.7 MG/DL (ref 0.57–1)
DEPRECATED RDW RBC AUTO: 43.9 FL (ref 37–54)
EOSINOPHIL # BLD AUTO: 0.35 10*3/MM3 (ref 0–0.4)
EOSINOPHIL NFR BLD AUTO: 4.7 % (ref 0.3–6.2)
ERYTHROCYTE [DISTWIDTH] IN BLOOD BY AUTOMATED COUNT: 12.8 % (ref 12.3–15.4)
GFR SERPL CREATININE-BSD FRML MDRD: 94 ML/MIN/1.73
GLOBULIN UR ELPH-MCNC: 3 GM/DL
GLUCOSE BLD-MCNC: 95 MG/DL (ref 65–99)
GLUCOSE UR STRIP-MCNC: NEGATIVE MG/DL
GRAN CASTS URNS QL MICRO: ABNORMAL /LPF
HCT VFR BLD AUTO: 38 % (ref 34–46.6)
HGB BLD-MCNC: 12.5 G/DL (ref 12–15.9)
HGB UR QL STRIP.AUTO: ABNORMAL
HYALINE CASTS UR QL AUTO: ABNORMAL /LPF
IMM GRANULOCYTES # BLD AUTO: 0.02 10*3/MM3 (ref 0–0.05)
IMM GRANULOCYTES NFR BLD AUTO: 0.3 % (ref 0–0.5)
KETONES UR QL STRIP: NEGATIVE
LEUKOCYTE ESTERASE UR QL STRIP.AUTO: NEGATIVE
LYMPHOCYTES # BLD AUTO: 2.68 10*3/MM3 (ref 0.7–3.1)
LYMPHOCYTES NFR BLD AUTO: 36.2 % (ref 19.6–45.3)
MCH RBC QN AUTO: 30.8 PG (ref 26.6–33)
MCHC RBC AUTO-ENTMCNC: 32.9 G/DL (ref 31.5–35.7)
MCV RBC AUTO: 93.6 FL (ref 79–97)
MONOCYTES # BLD AUTO: 0.62 10*3/MM3 (ref 0.1–0.9)
MONOCYTES NFR BLD AUTO: 8.4 % (ref 5–12)
MUCOUS THREADS URNS QL MICRO: ABNORMAL /HPF
NEUTROPHILS # BLD AUTO: 3.68 10*3/MM3 (ref 1.7–7)
NEUTROPHILS NFR BLD AUTO: 49.7 % (ref 42.7–76)
NITRITE UR QL STRIP: NEGATIVE
NRBC BLD AUTO-RTO: 0 /100 WBC (ref 0–0.2)
PH UR STRIP.AUTO: 6 [PH] (ref 4.5–8)
PLATELET # BLD AUTO: 272 10*3/MM3 (ref 140–450)
PMV BLD AUTO: 9.9 FL (ref 6–12)
POTASSIUM BLD-SCNC: 4.4 MMOL/L (ref 3.5–5.2)
PROT SERPL-MCNC: 6.7 G/DL (ref 6–8.5)
PROT UR QL STRIP: NEGATIVE
RBC # BLD AUTO: 4.06 10*6/MM3 (ref 3.77–5.28)
RBC # UR: ABNORMAL /HPF
REF LAB TEST METHOD: ABNORMAL
SODIUM BLD-SCNC: 139 MMOL/L (ref 136–145)
SP GR UR STRIP: 1.02 (ref 1–1.03)
SQUAMOUS #/AREA URNS HPF: ABNORMAL /HPF
UROBILINOGEN UR QL STRIP: ABNORMAL
WBC NRBC COR # BLD: 7.4 10*3/MM3 (ref 3.4–10.8)
WBC UR QL AUTO: ABNORMAL /HPF

## 2019-12-09 PROCEDURE — 99284 EMERGENCY DEPT VISIT MOD MDM: CPT

## 2019-12-09 PROCEDURE — 80053 COMPREHEN METABOLIC PANEL: CPT | Performed by: EMERGENCY MEDICINE

## 2019-12-09 PROCEDURE — 74176 CT ABD & PELVIS W/O CONTRAST: CPT

## 2019-12-09 PROCEDURE — 81025 URINE PREGNANCY TEST: CPT | Performed by: EMERGENCY MEDICINE

## 2019-12-09 PROCEDURE — 81001 URINALYSIS AUTO W/SCOPE: CPT | Performed by: EMERGENCY MEDICINE

## 2019-12-09 PROCEDURE — 99282 EMERGENCY DEPT VISIT SF MDM: CPT | Performed by: PHYSICIAN ASSISTANT

## 2019-12-09 PROCEDURE — 85025 COMPLETE CBC W/AUTO DIFF WBC: CPT | Performed by: EMERGENCY MEDICINE

## 2019-12-09 PROCEDURE — 96374 THER/PROPH/DIAG INJ IV PUSH: CPT

## 2019-12-09 PROCEDURE — 25010000002 KETOROLAC TROMETHAMINE PER 15 MG: Performed by: PHYSICIAN ASSISTANT

## 2019-12-09 RX ORDER — KETOROLAC TROMETHAMINE 30 MG/ML
30 INJECTION, SOLUTION INTRAMUSCULAR; INTRAVENOUS ONCE
Status: COMPLETED | OUTPATIENT
Start: 2019-12-09 | End: 2019-12-09

## 2019-12-09 RX ORDER — HYDROCODONE BITARTRATE AND ACETAMINOPHEN 5; 325 MG/1; MG/1
1 TABLET ORAL ONCE
Status: COMPLETED | OUTPATIENT
Start: 2019-12-09 | End: 2019-12-09

## 2019-12-09 RX ORDER — METHOCARBAMOL 750 MG/1
750 TABLET, FILM COATED ORAL 3 TIMES DAILY PRN
Qty: 20 TABLET | Refills: 0 | Status: SHIPPED | OUTPATIENT
Start: 2019-12-09 | End: 2020-10-30

## 2019-12-09 RX ORDER — METHOCARBAMOL 500 MG/1
750 TABLET, FILM COATED ORAL ONCE
Status: COMPLETED | OUTPATIENT
Start: 2019-12-09 | End: 2019-12-09

## 2019-12-09 RX ADMIN — KETOROLAC TROMETHAMINE 30 MG: 30 INJECTION, SOLUTION INTRAMUSCULAR at 18:30

## 2019-12-09 RX ADMIN — HYDROCODONE BITARTRATE AND ACETAMINOPHEN 1 TABLET: 5; 325 TABLET ORAL at 19:55

## 2019-12-09 RX ADMIN — METHOCARBAMOL 750 MG: 500 TABLET, FILM COATED ORAL at 18:31

## 2019-12-09 NOTE — ED PROVIDER NOTES
EMERGENCY DEPARTMENT ENCOUNTER      Room Number: 8/08    History is provided by the patient, no translation services needed    HPI:    Chief complaint: Flank pain/back pain    Location: Left flank, left lower back    Quality/Severity: 10/10, sharp    Timing/Duration: 3 days, constant    Modifying Factors: Pain tends to increase more with movement, however patient states at times it will also increase at rest    Associated Symptoms: Positive for left flank pain and left lumbar pain    Narrative: Pt is a 39 y.o. female who presents complaining of the above symptoms for 3 days.  Patient denies any significant PMH or any history of kidney stones.  She states she went to sit down on her toilet 3 days ago when she had a sudden onset of pain in her left flank.  She states since then the pain seems to have migrated down and settled in her left lower back.  She denies any urinary complaints, fever, chills.  She reports that the pain typically increases with movement however when she is at rest she will have intermittent surges of pain as well.  She denies any saddle anesthesia, loss of bowel or bladder control, or sciatic features.      PMD: Provider, No Known    REVIEW OF SYSTEMS  Review of Systems   Constitutional: Negative for chills, fatigue and fever.   Respiratory: Negative for cough and shortness of breath.    Cardiovascular: Negative for chest pain and palpitations.   Gastrointestinal: Negative for abdominal pain, diarrhea, nausea and vomiting.   Genitourinary: Positive for flank pain. Negative for difficulty urinating, dysuria and urgency.   Musculoskeletal: Positive for back pain. Negative for gait problem.   Skin: Negative for pallor and rash.   Neurological: Negative for dizziness, syncope and headaches.   Psychiatric/Behavioral: Negative for confusion. The patient is not nervous/anxious.              PAST MEDICAL HISTORY  Active Ambulatory Problems     Diagnosis Date Noted   • Left ankle injury, initial  encounter 11/27/2019     Resolved Ambulatory Problems     Diagnosis Date Noted   • No Resolved Ambulatory Problems     Past Medical History:   Diagnosis Date   • Ankle pain, left        PAST SURGICAL HISTORY  Past Surgical History:   Procedure Laterality Date   • APPENDECTOMY     • DILATATION AND CURETTAGE     • OOPHORECTOMY         FAMILY HISTORY  History reviewed. No pertinent family history.    SOCIAL HISTORY  Social History     Socioeconomic History   • Marital status: Single     Spouse name: Not on file   • Number of children: Not on file   • Years of education: Not on file   • Highest education level: Not on file   Tobacco Use   • Smoking status: Current Every Day Smoker     Packs/day: 0.50   Substance and Sexual Activity   • Alcohol use: No   • Drug use: No       ALLERGIES  Patient has no known allergies.    No current facility-administered medications for this encounter.     Current Outpatient Medications:   •  diazePAM (VALIUM) 5 MG tablet, 1 tablet one hour prior to testing then a second tablet five minutes before testing, Disp: 2 tablet, Rfl: 0  •  ibuprofen (ADVIL,MOTRIN) 200 MG tablet, Take 200 mg by mouth Every 6 (Six) Hours As Needed for Mild Pain ., Disp: , Rfl:   •  meloxicam (MOBIC) 15 MG tablet, Take 1 tablet by mouth Daily., Disp: 30 tablet, Rfl: 2  •  methocarbamol (ROBAXIN) 750 MG tablet, Take 1 tablet by mouth 3 (Three) Times a Day As Needed for Muscle Spasms., Disp: 20 tablet, Rfl: 0  •  methylPREDNISolone (MEDROL, SADIA,) 4 MG tablet, Take as directed on package instructions., Disp: 21 tablet, Rfl: 0    PHYSICAL EXAM  ED Triage Vitals [12/09/19 1703]   Temp Heart Rate Resp BP SpO2   98.3 °F (36.8 °C) 79 16 130/86 99 %      Temp src Heart Rate Source Patient Position BP Location FiO2 (%)   Oral Monitor Sitting Right arm --       Physical Exam   Constitutional: She is oriented to person, place, and time and well-developed, well-nourished, and in no distress. Vital signs are normal. She appears  toxic.   HENT:   Head: Normocephalic and atraumatic.   Mouth/Throat: Oropharynx is clear and moist.   Eyes: Pupils are equal, round, and reactive to light. Conjunctivae are normal.   Cardiovascular: Normal rate, regular rhythm, normal heart sounds and intact distal pulses.   Pulmonary/Chest: Effort normal and breath sounds normal.   Abdominal: Soft. Bowel sounds are normal. There is no tenderness. There is CVA tenderness (Slight left CVA tenderness).   Musculoskeletal: Normal range of motion.        Lumbar back: She exhibits tenderness (Left paraspinal tenderness). She exhibits no bony tenderness.   Neurological: She is alert and oriented to person, place, and time. Gait normal. GCS score is 15.   Skin: Skin is warm and dry. No rash noted.   Psychiatric: Mood, memory, affect and judgment normal.         LAB RESULTS  Results for orders placed or performed during the hospital encounter of 12/09/19   Comprehensive Metabolic Panel   Result Value Ref Range    Glucose 95 65 - 99 mg/dL    BUN 12 6 - 20 mg/dL    Creatinine 0.70 0.57 - 1.00 mg/dL    Sodium 139 136 - 145 mmol/L    Potassium 4.4 3.5 - 5.2 mmol/L    Chloride 104 98 - 107 mmol/L    CO2 24.7 22.0 - 29.0 mmol/L    Calcium 8.9 8.6 - 10.5 mg/dL    Total Protein 6.7 6.0 - 8.5 g/dL    Albumin 3.70 3.50 - 5.20 g/dL    ALT (SGPT) 9 1 - 33 U/L    AST (SGOT) 14 1 - 32 U/L    Alkaline Phosphatase 14 (L) 39 - 117 U/L    Total Bilirubin 0.2 0.2 - 1.2 mg/dL    eGFR Non African Amer 94 >60 mL/min/1.73    Globulin 3.0 gm/dL    A/G Ratio 1.2 g/dL    BUN/Creatinine Ratio 17.1 7.0 - 25.0    Anion Gap 10.3 5.0 - 15.0 mmol/L   Pregnancy, Urine - Urine, Clean Catch   Result Value Ref Range    HCG, Urine QL Negative Negative   Urinalysis With Culture If Indicated - Urine, Clean Catch   Result Value Ref Range    Color, UA Yellow Yellow, Straw    Appearance, UA Clear Clear    pH, UA 6.0 4.5 - 8.0    Specific Gravity, UA 1.025 1.003 - 1.030    Glucose, UA Negative Negative    Ketones, UA  Negative Negative    Bilirubin, UA Negative Negative    Blood, UA Trace (A) Negative    Protein, UA Negative Negative    Leuk Esterase, UA Negative Negative    Nitrite, UA Negative Negative    Urobilinogen, UA 0.2 E.U./dL 0.2 - 1.0 E.U./dL   CBC Auto Differential   Result Value Ref Range    WBC 7.40 3.40 - 10.80 10*3/mm3    RBC 4.06 3.77 - 5.28 10*6/mm3    Hemoglobin 12.5 12.0 - 15.9 g/dL    Hematocrit 38.0 34.0 - 46.6 %    MCV 93.6 79.0 - 97.0 fL    MCH 30.8 26.6 - 33.0 pg    MCHC 32.9 31.5 - 35.7 g/dL    RDW 12.8 12.3 - 15.4 %    RDW-SD 43.9 37.0 - 54.0 fl    MPV 9.9 6.0 - 12.0 fL    Platelets 272 140 - 450 10*3/mm3    Neutrophil % 49.7 42.7 - 76.0 %    Lymphocyte % 36.2 19.6 - 45.3 %    Monocyte % 8.4 5.0 - 12.0 %    Eosinophil % 4.7 0.3 - 6.2 %    Basophil % 0.7 0.0 - 1.5 %    Immature Grans % 0.3 0.0 - 0.5 %    Neutrophils, Absolute 3.68 1.70 - 7.00 10*3/mm3    Lymphocytes, Absolute 2.68 0.70 - 3.10 10*3/mm3    Monocytes, Absolute 0.62 0.10 - 0.90 10*3/mm3    Eosinophils, Absolute 0.35 0.00 - 0.40 10*3/mm3    Basophils, Absolute 0.05 0.00 - 0.20 10*3/mm3    Immature Grans, Absolute 0.02 0.00 - 0.05 10*3/mm3    nRBC 0.0 0.0 - 0.2 /100 WBC   Urinalysis, Microscopic Only - Urine, Clean Catch   Result Value Ref Range    RBC, UA 0-2 (A) None Seen /HPF    WBC, UA None Seen None Seen /HPF    Bacteria, UA Trace (A) None Seen /HPF    Squamous Epithelial Cells, UA 0-2 None Seen, 0-2 /HPF    Hyaline Casts, UA None Seen None Seen /LPF    Granular Casts, UA 0-2 None Seen /LPF    Calcium Oxalate Crystals, UA Small/1+ None Seen /HPF    Mucus, UA Trace None Seen, Trace /HPF    Methodology Manual Light Microscopy          I ordered the above labs and reviewed the results    RADIOLOGY  Ct Abdomen Pelvis Without Contrast    Result Date: 12/9/2019  Narrative: CT Abdomen Pelvis WO INDICATION: Left lower quadrant pain for 2 days TECHNIQUE: CT of the abdomen and pelvis without contrast. Coronal and sagittal reconstructions were  obtained.  Radiation dose reduction techniques included automated exposure control or exposure modulation based on body size. Radiation audit for number of CT and nuclear cardiology exams performed in the last year: 0.  COMPARISON: None available. FINDINGS: Visualized lung bases are unremarkable. Abdomen: The liver is normal and the spleen and pancreas are normal. There is cholelithiasis, but no CT evidence of cholecystitis or biliary obstruction. The kidneys and adrenal glands are normal and the aorta is normal in caliber there is no bowel obstruction, or abdominal or retroperitoneal adenopathy or other mass. Pelvis:  The appendix is normal. There is no pelvic mass or inflammatory change, hernia or bowel obstruction. There are minimal spinal degenerative changes, but there is no acute bony abnormality.     Impression: No convincing acute abnormality. There is cholelithiasis without CT evidence of cholecystitis or biliary obstruction. No convincing acute abnormality. Signer Name: Ravi Drummond MD  Signed: 12/9/2019 6:35 PM  Workstation Name: [facility]  [facility]      I ordered the above radiologic testing and reviewed the results    PROCEDURES  Procedures      PROGRESS AND CONSULTS  ED Course as of Dec 10 1210   Mon Dec 09, 2019   1950 Patient had no relief from Robaxin or Toradol.  I reviewed her results of labs and imaging, and discussed that I could not find any acute cause for her pain today.  She has been reporting left flank pain, but most of her tenderness is in her left lumbar region.  I discussed possibility of lumbar strain with her, she states her pain began as she was sitting down on a toilet.  She denies any trauma or history of back injury.  She has no radicular pain at this time either.  I discussed with patient that I will prescribe her Robaxin and ibuprofen, and I have instructed her to follow-up with a primary care doctor for further evaluation if she does not  improve.  I also recommended she apply heat, massage the area, and perform gentle stretching.  We will give her a Norco here prior to discharge.  Patient is agreeable with this plan and verbalizes understanding.    [KS]      ED Course User Index  [KS] Bree Corrales PA-C           MEDICAL DECISION MAKING  Results were reviewed/discussed with the patient and they were also made aware of online access. Pt also made aware that some labs, such as cultures, will not be resulted during ER visit and follow up with PMD is necessary.     MDM  Number of Diagnoses or Management Options  Acute left-sided low back pain without sciatica:   Left flank pain:      Amount and/or Complexity of Data Reviewed  Clinical lab tests: reviewed  Tests in the radiology section of CPT®: reviewed           DIAGNOSIS  Final diagnoses:   Acute left-sided low back pain without sciatica   Left flank pain       Latest Documented Vital Signs:  As of 12:10 PM  BP- 112/77 HR- 82 Temp- 98.3 °F (36.8 °C) (Oral) O2 sat- 98%    DISPOSITION  Patient discharged home in care of her mother with instructions to follow-up with a primary care provider.    Discussed pertinent labs and imaging findings with the patient/family.  Patient/Family voiced understanding of need to follow-up for recheck, further testing as needed.  Return to the emergency Department warnings were given.         Medication List      New Prescriptions    methocarbamol 750 MG tablet  Commonly known as:  ROBAXIN  Take 1 tablet by mouth 3 (Three) Times a Day As Needed for Muscle Spasms.              Follow-up Information     Provider, No Known. Call in 1 day.    Why:  To schedule follow-up appointment  Contact information:  [facility] [address]                     Dictated utilizing Dragon dictation       Bree Corrales PA-C  12/10/19 1212

## 2019-12-12 ENCOUNTER — HOSPITAL ENCOUNTER (OUTPATIENT)
Dept: MRI IMAGING | Facility: HOSPITAL | Age: 39
End: 2019-12-12

## 2019-12-12 ENCOUNTER — HOSPITAL ENCOUNTER (OUTPATIENT)
Dept: MRI IMAGING | Facility: HOSPITAL | Age: 39
Discharge: HOME | End: 2019-12-12
Admitting: NURSE PRACTITIONER

## 2019-12-12 DIAGNOSIS — S99.912A LEFT ANKLE INJURY, INITIAL ENCOUNTER: ICD-10-CM

## 2019-12-12 PROCEDURE — 73721 MRI JNT OF LWR EXTRE W/O DYE: CPT

## 2019-12-13 ENCOUNTER — TELEPHONE (OUTPATIENT)
Dept: ORTHOPEDIC SURGERY | Facility: CLINIC | Age: 39
End: 2019-12-13

## 2019-12-13 DIAGNOSIS — S93.402A SPRAIN OF LEFT ANKLE, UNSPECIFIED LIGAMENT, INITIAL ENCOUNTER: Primary | ICD-10-CM

## 2019-12-13 NOTE — TELEPHONE ENCOUNTER
Called patient to discuss MRI results left ankle:  MRI Ankle LT WO     INDICATION:    39-year-old female with medial left ankle pain after falling off horse August 2019. No prior left ankle surgery.     TECHNIQUE:   MRI of the left ankle without IV contrast.     COMPARISON:    Left ankle x-rays 9/17/2019     FINDINGS:  The Achilles tendon is normal. Medial tendons, peroneal tendons, and anterior tendons of the ankle are within normal limits. Syndesmotic ligaments are intact. Moderate thickening and attenuation of the anterior talofibular ligament and calcaneofibular  ligament, suggesting the sequelae of remote lateral ankle sprain. There is a well-corticated osseous fragment anterior to the lateral malleolus suggesting prior avulsion injury. Posterior talofibular ligament intact. Deep deltoid ligament appears intact.  Superficial spring ligament appears intact.     Plantar fascia is normal. Sinus tarsi is unremarkable. Talar dome intact. Subtalar joint is normal. No ankle effusion. Bone marrow signal appears within expected limits. Muscle signal intensities are normal.     IMPRESSION:     1. Sequelae of remote lateral ankle sprain with moderate thickening and attenuation of the anterior talofibular ligament and calcaneofibular ligament. Well-corticated osseous fragment anterior to the lateral malleolus suggesting prior avulsion injury.  2. No medial sided pathology to explain symptoms.  3. No acute tendon or osseous injury.     Signer Name: Conrad Randall MD   Signed: 12/13/2019 9:41 AM   Workstation Name: [email or web address]-    [facility] discussed plan of care with patient's mom and entered the phone states she will relay the information she is emergency contact.    We will proceed with physical therapy for range of motion strengthening left ankle.  We will plan to see her back in clinic in 6 weeks patient is going to return call.

## 2020-10-30 ENCOUNTER — HOSPITAL ENCOUNTER (EMERGENCY)
Facility: HOSPITAL | Age: 40
Discharge: HOME | End: 2020-10-30
Attending: EMERGENCY MEDICINE | Admitting: EMERGENCY MEDICINE

## 2020-10-30 ENCOUNTER — APPOINTMENT (OUTPATIENT)
Dept: CT IMAGING | Facility: HOSPITAL | Age: 40
End: 2020-10-30

## 2020-10-30 VITALS
HEIGHT: 60 IN | TEMPERATURE: 98.3 F | OXYGEN SATURATION: 97 % | RESPIRATION RATE: 14 BRPM | DIASTOLIC BLOOD PRESSURE: 104 MMHG | BODY MASS INDEX: 31.41 KG/M2 | SYSTOLIC BLOOD PRESSURE: 153 MMHG | HEART RATE: 93 BPM | WEIGHT: 160 LBS

## 2020-10-30 DIAGNOSIS — R03.0 ELEVATED BLOOD PRESSURE READING: ICD-10-CM

## 2020-10-30 DIAGNOSIS — R51.9 ACUTE NONINTRACTABLE HEADACHE, UNSPECIFIED HEADACHE TYPE: Primary | ICD-10-CM

## 2020-10-30 PROCEDURE — 25010000002 DIPHENHYDRAMINE PER 50 MG: Performed by: PHYSICIAN ASSISTANT

## 2020-10-30 PROCEDURE — 70450 CT HEAD/BRAIN W/O DYE: CPT

## 2020-10-30 PROCEDURE — 25010000002 DEXAMETHASONE PER 1 MG: Performed by: PHYSICIAN ASSISTANT

## 2020-10-30 PROCEDURE — 99283 EMERGENCY DEPT VISIT LOW MDM: CPT

## 2020-10-30 PROCEDURE — 25010000002 PROCHLORPERAZINE 10 MG/2ML SOLUTION: Performed by: PHYSICIAN ASSISTANT

## 2020-10-30 PROCEDURE — 99282 EMERGENCY DEPT VISIT SF MDM: CPT | Performed by: PHYSICIAN ASSISTANT

## 2020-10-30 PROCEDURE — 96374 THER/PROPH/DIAG INJ IV PUSH: CPT

## 2020-10-30 PROCEDURE — 96375 TX/PRO/DX INJ NEW DRUG ADDON: CPT

## 2020-10-30 RX ORDER — SODIUM CHLORIDE 0.9 % (FLUSH) 0.9 %
10 SYRINGE (ML) INJECTION AS NEEDED
Status: DISCONTINUED | OUTPATIENT
Start: 2020-10-30 | End: 2020-10-30 | Stop reason: HOSPADM

## 2020-10-30 RX ORDER — DIPHENHYDRAMINE HYDROCHLORIDE 50 MG/ML
25 INJECTION INTRAMUSCULAR; INTRAVENOUS ONCE
Status: COMPLETED | OUTPATIENT
Start: 2020-10-30 | End: 2020-10-30

## 2020-10-30 RX ORDER — DEXAMETHASONE SODIUM PHOSPHATE 10 MG/ML
10 INJECTION INTRAMUSCULAR; INTRAVENOUS ONCE
Status: COMPLETED | OUTPATIENT
Start: 2020-10-30 | End: 2020-10-30

## 2020-10-30 RX ORDER — DEXAMETHASONE SODIUM PHOSPHATE 4 MG/ML
INJECTION, SOLUTION INTRA-ARTICULAR; INTRALESIONAL; INTRAMUSCULAR; INTRAVENOUS; SOFT TISSUE
Status: DISCONTINUED
Start: 2020-10-30 | End: 2020-10-30 | Stop reason: HOSPADM

## 2020-10-30 RX ORDER — PROCHLORPERAZINE EDISYLATE 5 MG/ML
10 INJECTION INTRAMUSCULAR; INTRAVENOUS ONCE
Status: COMPLETED | OUTPATIENT
Start: 2020-10-30 | End: 2020-10-30

## 2020-10-30 RX ADMIN — DIPHENHYDRAMINE HYDROCHLORIDE 25 MG: 50 INJECTION, SOLUTION INTRAMUSCULAR; INTRAVENOUS at 17:52

## 2020-10-30 RX ADMIN — PROCHLORPERAZINE EDISYLATE 10 MG: 5 INJECTION INTRAMUSCULAR; INTRAVENOUS at 17:49

## 2020-10-30 RX ADMIN — SODIUM CHLORIDE 1000 ML: 9 INJECTION, SOLUTION INTRAVENOUS at 17:58

## 2020-10-30 RX ADMIN — SODIUM CHLORIDE, PRESERVATIVE FREE 10 ML: 5 INJECTION INTRAVENOUS at 18:00

## 2020-10-30 RX ADMIN — DEXAMETHASONE SODIUM PHOSPHATE 10 MG: 10 INJECTION INTRAMUSCULAR; INTRAVENOUS at 17:51

## 2021-04-21 ENCOUNTER — APPOINTMENT (OUTPATIENT)
Dept: GENERAL RADIOLOGY | Facility: HOSPITAL | Age: 41
End: 2021-04-21

## 2021-04-21 ENCOUNTER — HOSPITAL ENCOUNTER (EMERGENCY)
Facility: HOSPITAL | Age: 41
Discharge: HOME | End: 2021-04-21
Attending: EMERGENCY MEDICINE | Admitting: EMERGENCY MEDICINE

## 2021-04-21 VITALS
HEART RATE: 96 BPM | SYSTOLIC BLOOD PRESSURE: 156 MMHG | TEMPERATURE: 97.2 F | DIASTOLIC BLOOD PRESSURE: 109 MMHG | WEIGHT: 160 LBS | HEIGHT: 62 IN | BODY MASS INDEX: 29.44 KG/M2 | OXYGEN SATURATION: 100 % | RESPIRATION RATE: 12 BRPM

## 2021-04-21 DIAGNOSIS — S90.32XA CONTUSION OF LEFT FOOT, INITIAL ENCOUNTER: ICD-10-CM

## 2021-04-21 DIAGNOSIS — R03.0 ELEVATED BLOOD PRESSURE READING: ICD-10-CM

## 2021-04-21 DIAGNOSIS — L03.116 CELLULITIS OF LEFT FOOT EXCLUDING TOES: Primary | ICD-10-CM

## 2021-04-21 PROCEDURE — 73630 X-RAY EXAM OF FOOT: CPT

## 2021-04-21 PROCEDURE — 99283 EMERGENCY DEPT VISIT LOW MDM: CPT

## 2021-04-21 PROCEDURE — 99283 EMERGENCY DEPT VISIT LOW MDM: CPT | Performed by: EMERGENCY MEDICINE

## 2021-04-21 RX ORDER — CEPHALEXIN 500 MG/1
500 CAPSULE ORAL 3 TIMES DAILY
Qty: 30 CAPSULE | Refills: 0 | Status: SHIPPED | OUTPATIENT
Start: 2021-04-21 | End: 2021-05-27

## 2021-04-21 RX ORDER — CEPHALEXIN 500 MG/1
500 CAPSULE ORAL ONCE
Status: COMPLETED | OUTPATIENT
Start: 2021-04-21 | End: 2021-04-21

## 2021-04-21 RX ADMIN — CEPHALEXIN 500 MG: 500 CAPSULE ORAL at 21:51

## 2021-04-22 NOTE — ED NOTES
Left foot pain, redness, and swelling x 2 days. Pt states she dropped a can of spray foam on her foot but otherwise has not injured her foot. Pt states she has pain when walking. Pt has limited rom in toes. Pulses palpable.      Brenda Lombardi RN  04/21/21 9718

## 2021-04-22 NOTE — DISCHARGE INSTRUCTIONS
Take Motrin or Tylenol as needed as directed for pain.  Take the antibiotics as prescribed.  Call Dr. Jones's office in the morning for a follow-up appointment on Monday.  Return to the emergency department if there is increased pain, redness, numbness, tingling, fever, chills, worse in any way at all.

## 2021-04-22 NOTE — ED PROVIDER NOTES
Subjective   History of Present Illness  History of Present Illness    Chief complaint: Foot pain    Location: Left foot    Quality/Severity: Moderate, sharp    Timing/Onset/Duration: Today    Modifying Factors: It hurts to touch the area, feels better to not touch it    Associated Symptoms: No fever or chills.  No numbness, tingling, or weakness.  There is some redness.    Narrative: This 40-year-old white female dropped a can on her foot a few days ago.  Today she noticed that she has got pain and redness and swelling.  She denies any fever or chills.  No numbness, tingling, weakness.    PCP: No known provider      Review of Systems   Constitutional: Negative for chills and fever.   Musculoskeletal:        Left foot pain   Skin: Negative for rash.   Neurological: Negative for weakness and numbness.        Medication List      ASK your doctor about these medications    ibuprofen 200 MG tablet  Commonly known as: ADVIL,MOTRIN            Past Medical History:   Diagnosis Date   • Ankle pain, left     torn ligaments       No Known Allergies    Past Surgical History:   Procedure Laterality Date   • APPENDECTOMY     • DILATATION AND CURETTAGE     • OOPHORECTOMY         History reviewed. No pertinent family history.    Social History     Socioeconomic History   • Marital status: Single     Spouse name: Not on file   • Number of children: Not on file   • Years of education: Not on file   • Highest education level: Not on file   Tobacco Use   • Smoking status: Current Every Day Smoker     Packs/day: 0.50   Substance and Sexual Activity   • Alcohol use: No   • Drug use: No           Objective   Physical Exam  Vitals and nursing note reviewed.   Constitutional:       Appearance: Normal appearance.   Musculoskeletal:      Comments: There is tenderness upon palpation of the left dorsal foot proximal to the toes.  There is swelling and redness that ranges from the fourth to the second toe across the dorsal aspect of the foot.   The capillary refill is less than 2 seconds.  The sensation is intact.  There is a normal range of motion noted.  There is no joint laxity noted.  There is a 2+ dorsalis pedis and posterior tibial pulse.   Skin:     General: Skin is warm and dry.      Capillary Refill: Capillary refill takes less than 2 seconds.      Findings: Erythema present.   Neurological:      Mental Status: She is alert and oriented to person, place, and time.      Sensory: No sensory deficit.      Motor: No weakness.         Procedures           ED Course      21:41 EDT, 04/21/21:  The patient's diagnosis of cellulitis of the left foot and left foot contusion was discussed with her.  The patient will be given a dose of Keflex here in the emergency department and given a prescription for Keflex.  She should elevate the left foot.  She should take Motrin or Tylenol as needed as directed for pain.  Should follow up with Dr. Jones on Monday.  Should return to the emergency department if there is increased pain, numbness, tingling, weakness, change in color or temperature, fever, chills, worse in any way at all.  All the patient's questions were answered she will be discharged in good condition.    21:55 EDT, 04/21/21:  The patient's repeat blood pressure was 156/109.  She has been encouraged to check her blood pressure a couple times a day and write down the time and the value.  She should take this to her primary care provider's office next week for recheck of her blood pressure.                                     MDM    Final diagnoses:   Cellulitis of left foot excluding toes   Contusion of left foot, initial encounter   Elevated blood pressure reading       ED Disposition  ED Disposition     None          No follow-up provider specified.       Medication List      No changes were made to your prescriptions during this visit.          Isaiah Doan MD  04/21/21 3995

## 2021-05-27 ENCOUNTER — OFFICE VISIT (OUTPATIENT)
Dept: INTERNAL MEDICINE | Facility: CLINIC | Age: 41
End: 2021-05-27

## 2021-05-27 VITALS
TEMPERATURE: 97.1 F | SYSTOLIC BLOOD PRESSURE: 164 MMHG | HEART RATE: 111 BPM | BODY MASS INDEX: 41.96 KG/M2 | RESPIRATION RATE: 16 BRPM | HEIGHT: 62 IN | WEIGHT: 228 LBS | DIASTOLIC BLOOD PRESSURE: 106 MMHG | OXYGEN SATURATION: 98 %

## 2021-05-27 DIAGNOSIS — R63.5 ABNORMAL WEIGHT GAIN: ICD-10-CM

## 2021-05-27 DIAGNOSIS — I10 ESSENTIAL HYPERTENSION: Primary | ICD-10-CM

## 2021-05-27 DIAGNOSIS — Z00.00 ROUTINE HEALTH MAINTENANCE: ICD-10-CM

## 2021-05-27 PROBLEM — S99.912A LEFT ANKLE INJURY, INITIAL ENCOUNTER: Status: RESOLVED | Noted: 2019-11-27 | Resolved: 2021-05-27

## 2021-05-27 PROCEDURE — 99214 OFFICE O/P EST MOD 30 MIN: CPT | Performed by: NURSE PRACTITIONER

## 2021-05-27 RX ORDER — AMLODIPINE BESYLATE 5 MG/1
5 TABLET ORAL DAILY
Qty: 30 TABLET | Refills: 0 | Status: SHIPPED | OUTPATIENT
Start: 2021-05-27 | End: 2021-06-15 | Stop reason: SDUPTHER

## 2021-05-27 NOTE — PROGRESS NOTES
"Subjective    Sofy Martines is a 41 y.o. female presenting today for   Chief Complaint   Patient presents with   • Establish Care   • Hypertension       History of Present Illness     Sofy Martines presents today as a new patient to me to establish care.   Prior PCP was no one for many years.    Current/chronic health conditions include:    Patient Active Problem List   Diagnosis   • Essential hypertension       No current outpatient medications on file.        New complaints today include:  She has been noted to have elevated BP on last several checks. She has no prior dx of HTN. She is symptomatic w/ STONE. [place] CP, SOB, palpitations or dizziness.    She reports a 70-80# wgt gain over the last year. Her eating habit and exercise habits have not changed significantly in that time period.      The following portions of the patient's history were reviewed and updated as appropriate: allergies, current medications, problem list, past medical history, past surgical history, family history, and social history.     Review of Systems   Constitutional: Positive for unexpected weight gain.   Respiratory: Negative for shortness of breath.    Cardiovascular: Negative for chest pain and palpitations.   Neurological: Positive for headache. Negative for dizziness.       Objective    Vitals:    05/27/21 1143 05/27/21 1216   BP: 164/100 (!) 164/106   BP Location: Left arm    Patient Position: Sitting    Cuff Size: Large Adult    Pulse: 111    Resp: 16    Temp: 97.1 °F (36.2 °C)    TempSrc: Temporal    SpO2: 98%    Weight: 103 kg (228 lb)    Height: 157.5 cm (62.01\")      Body mass index is 41.69 kg/m².  Nursing notes and vitals reviewed.    Physical Exam  Constitutional:       General: She is not in acute distress.     Appearance: She is well-developed.   Neck:      Thyroid: No thyroid mass or thyromegaly.   Cardiovascular:      Rate and Rhythm: Regular rhythm.      Pulses: Normal pulses.      Heart sounds: S1 normal and S2 " normal.      Comments: No JVD or Bruit  Pulmonary:      Effort: Pulmonary effort is normal.      Breath sounds: Normal breath sounds.   Musculoskeletal:      Cervical back: Neck supple.      Right lower leg: No edema.      Left lower leg: No edema.   Lymphadenopathy:      Cervical: No cervical adenopathy.   Neurological:      Mental Status: She is alert and oriented to person, place, and time.   Psychiatric:         Attention and Perception: She is attentive.         Behavior: Behavior normal.         Thought Content: Thought content normal.         No results found for this or any previous visit (from the past 672 hour(s)).      Assessment and Plan    Diagnoses and all orders for this visit:    1. Essential hypertension (Primary)  Comments:  - new onset  - start Amlodipine  - self-monitor and bring log  Orders:  -     amLODIPine (NORVASC) 5 MG tablet; Take 1 tablet by mouth Daily.  Dispense: 30 tablet; Refill: 0  -     CBC (No Diff)  -     Comprehensive Metabolic Panel  -     Lipid Panel With / Chol / HDL Ratio  -     Thyroid Cascade Profile  -     Vitamin D 25 Hydroxy  -     Hepatitis C Antibody    2. Abnormal weight gain  -     CBC (No Diff)  -     Comprehensive Metabolic Panel  -     Lipid Panel With / Chol / HDL Ratio  -     Thyroid Cascade Profile  -     Vitamin D 25 Hydroxy  -     Hepatitis C Antibody    3. Routine health maintenance  -     CBC (No Diff)  -     Comprehensive Metabolic Panel  -     Lipid Panel With / Chol / HDL Ratio  -     Thyroid Cascade Profile  -     Vitamin D 25 Hydroxy  -     Hepatitis C Antibody              Medications, including side effects, were discussed with the patient. Patient verbalized understanding.  The plan of care was discussed. All questions were answered. Patient verbalized understanding.        Return in about 2 weeks (around 6/10/2021) for Annual physical.

## 2021-05-28 LAB
25(OH)D3+25(OH)D2 SERPL-MCNC: 27.2 NG/ML (ref 30–100)
ALBUMIN SERPL-MCNC: 4.3 G/DL (ref 3.5–5.2)
ALBUMIN/GLOB SERPL: 1.7 G/DL
ALP SERPL-CCNC: 19 U/L (ref 39–117)
ALT SERPL-CCNC: 12 U/L (ref 1–33)
AST SERPL-CCNC: 12 U/L (ref 1–32)
BILIRUB SERPL-MCNC: 0.2 MG/DL (ref 0–1.2)
BUN SERPL-MCNC: 9 MG/DL (ref 6–20)
BUN/CREAT SERPL: 8.4 (ref 7–25)
CALCIUM SERPL-MCNC: 9.6 MG/DL (ref 8.6–10.5)
CHLORIDE SERPL-SCNC: 102 MMOL/L (ref 98–107)
CHOLEST SERPL-MCNC: 144 MG/DL (ref 0–200)
CHOLEST/HDLC SERPL: 2.36 {RATIO}
CO2 SERPL-SCNC: 26.8 MMOL/L (ref 22–29)
CREAT SERPL-MCNC: 1.07 MG/DL (ref 0.57–1)
ERYTHROCYTE [DISTWIDTH] IN BLOOD BY AUTOMATED COUNT: 13.6 % (ref 12.3–15.4)
GLOBULIN SER CALC-MCNC: 2.5 GM/DL
GLUCOSE SERPL-MCNC: 82 MG/DL (ref 65–99)
HCT VFR BLD AUTO: 42.8 % (ref 34–46.6)
HCV AB S/CO SERPL IA: >11 S/CO RATIO (ref 0–0.9)
HDLC SERPL-MCNC: 61 MG/DL (ref 40–60)
HGB BLD-MCNC: 14.6 G/DL (ref 12–15.9)
LDLC SERPL CALC-MCNC: 65 MG/DL (ref 0–100)
MCH RBC QN AUTO: 30.4 PG (ref 26.6–33)
MCHC RBC AUTO-ENTMCNC: 34.1 G/DL (ref 31.5–35.7)
MCV RBC AUTO: 89 FL (ref 79–97)
PLATELET # BLD AUTO: 322 10*3/MM3 (ref 140–450)
POTASSIUM SERPL-SCNC: 4.4 MMOL/L (ref 3.5–5.2)
PROT SERPL-MCNC: 6.8 G/DL (ref 6–8.5)
RBC # BLD AUTO: 4.81 10*6/MM3 (ref 3.77–5.28)
SODIUM SERPL-SCNC: 138 MMOL/L (ref 136–145)
TRIGL SERPL-MCNC: 98 MG/DL (ref 0–150)
TSH SERPL DL<=0.005 MIU/L-ACNC: 1.39 UIU/ML (ref 0.45–4.5)
VLDLC SERPL CALC-MCNC: 18 MG/DL (ref 5–40)
WBC # BLD AUTO: 9 10*3/MM3 (ref 3.4–10.8)

## 2021-06-03 ENCOUNTER — TELEPHONE (OUTPATIENT)
Dept: INTERNAL MEDICINE | Facility: CLINIC | Age: 41
End: 2021-06-03

## 2021-06-03 DIAGNOSIS — I10 ESSENTIAL HYPERTENSION: Primary | ICD-10-CM

## 2021-06-03 NOTE — TELEPHONE ENCOUNTER
Caller: Sofy Martines    Relationship: Self    Best call back number: [phone]     What medication are you requesting: BLOOD PRESSURE EQUIPMENT    What are your current symptoms: HIGH BLOOD PRESSURE    Have you had these symptoms before:    [x] Yes  [] No    Have you been treated for these symptoms before:   [x] Yes  [] No    If a prescription is needed, what is your preferred pharmacy and phone number: [facility] [address] [facility] - [phone]  - [phone] FX     Additional notes: PATIENT WAS INSTRUCTED TO KEEP BLOOD PRESSURE LOG, BUT DOES NOT HAVE THE PROPER EQUIPMENT TO DO SO. SHE SAID THAT SHE WOULD LIKE A DIGITAL MONITOR, AS SHE DOESN'T UNDERSTAND HOW TRADITIONAL CUFFS WORK.

## 2021-06-10 ENCOUNTER — OFFICE VISIT (OUTPATIENT)
Dept: INTERNAL MEDICINE | Facility: CLINIC | Age: 41
End: 2021-06-10

## 2021-06-10 VITALS
RESPIRATION RATE: 18 BRPM | OXYGEN SATURATION: 97 % | DIASTOLIC BLOOD PRESSURE: 85 MMHG | HEIGHT: 62 IN | SYSTOLIC BLOOD PRESSURE: 155 MMHG | HEART RATE: 113 BPM | TEMPERATURE: 96.8 F | WEIGHT: 232 LBS | BODY MASS INDEX: 42.69 KG/M2

## 2021-06-10 DIAGNOSIS — I10 ESSENTIAL HYPERTENSION: ICD-10-CM

## 2021-06-10 DIAGNOSIS — E55.9 VITAMIN D DEFICIENCY: ICD-10-CM

## 2021-06-10 DIAGNOSIS — R76.8 HEPATITIS C ANTIBODY TEST POSITIVE: Primary | ICD-10-CM

## 2021-06-10 DIAGNOSIS — E66.01 CLASS 3 SEVERE OBESITY DUE TO EXCESS CALORIES WITH SERIOUS COMORBIDITY AND BODY MASS INDEX (BMI) OF 40.0 TO 44.9 IN ADULT (HCC): ICD-10-CM

## 2021-06-10 DIAGNOSIS — R76.8 HEPATITIS C ANTIBODY TEST POSITIVE: ICD-10-CM

## 2021-06-10 DIAGNOSIS — Z00.00 ENCOUNTER FOR WELLNESS EXAMINATION IN ADULT: Primary | ICD-10-CM

## 2021-06-10 PROCEDURE — 99214 OFFICE O/P EST MOD 30 MIN: CPT | Performed by: NURSE PRACTITIONER

## 2021-06-10 PROCEDURE — 99395 PREV VISIT EST AGE 18-39: CPT | Performed by: NURSE PRACTITIONER

## 2021-06-10 PROCEDURE — 3008F BODY MASS INDEX DOCD: CPT | Performed by: NURSE PRACTITIONER

## 2021-06-10 PROCEDURE — 2014F MENTAL STATUS ASSESS: CPT | Performed by: NURSE PRACTITIONER

## 2021-06-10 NOTE — PROGRESS NOTES
"MAREK Goetz is a 41 y.o. female presenting for Annual Exam    Her current/chronic health conditions include:  Patient Active Problem List   Diagnosis   • Essential hypertension       Current Outpatient Medications on File Prior to Visit   Medication Sig   • amLODIPine (NORVASC) 5 MG tablet Take 1 tablet by mouth Daily.     No current facility-administered medications on file prior to visit.     HTN - initiated Amlodipine 05/27/2021. BP continues to be high w/ home checks.    Health Habits:  Nutrition: nothing in particular  Exercise: 7 times/week.  Current exercise activities include: walking    Screenings:  Dental: UTD  Eye Exam: UTD  PAP: provided contact for GYN  Mammogram: n/a  Dexa: n/a  Colon Cancer: n/a  Tob use: former      Complaints today include:      The following portions of the patient's history were reviewed and updated as appropriate: allergies, current medications, problem list, past medical history, past family history, past medical history, and past social history.    Review of Systems   Constitutional: Positive for unexpected weight change.   HENT: Negative.    Eyes: Negative.    Respiratory: Negative.    Cardiovascular: Negative.    Gastrointestinal: Negative.    Endocrine: Negative.    Genitourinary: Negative.    Musculoskeletal: Negative.    Skin: Negative.    Allergic/Immunologic: Negative.    Neurological: Negative.    Hematological: Negative.    Psychiatric/Behavioral: Positive for decreased concentration.       OBJECTIVE    Vitals:    06/10/21 1439   BP: 155/85   Pulse: 113   Resp: 18   Temp: 96.8 °F (36 °C)   SpO2: 97%   Weight: 105 kg (232 lb)   Height: 157.5 cm (62.01\")       BP Readings from Last 3 Encounters:   06/10/21 155/85   05/27/21 (!) 164/106   04/21/21 (!) 156/109        Wt Readings from Last 3 Encounters:   06/10/21 105 kg (232 lb)   05/27/21 103 kg (228 lb)   04/21/21 72.6 kg (160 lb)        Body mass index is 42.42 kg/m².  Nursing notes and vital signs " reviewed.      Physical Exam  Constitutional:       General: She is not in acute distress.     Appearance: Normal appearance. She is well-developed. She is obese.   HENT:      Head: Normocephalic.      Right Ear: Hearing, tympanic membrane, ear canal and external ear normal.      Left Ear: Hearing, tympanic membrane, ear canal and external ear normal.      Nose: Nose normal. No mucosal edema or rhinorrhea.      Mouth/Throat:      Mouth: Mucous membranes are moist.      Pharynx: Oropharynx is clear. Uvula midline.   Eyes:      General: Lids are normal.      Extraocular Movements: Extraocular movements intact.      Conjunctiva/sclera: Conjunctivae normal.      Pupils: Pupils are equal, round, and reactive to light.   Neck:      Thyroid: No thyroid mass or thyromegaly.   Cardiovascular:      Rate and Rhythm: Regular rhythm.      Pulses: Normal pulses.      Heart sounds: S1 normal and S2 normal. No murmur heard.   No friction rub. No gallop.    Pulmonary:      Effort: Pulmonary effort is normal.      Breath sounds: Normal breath sounds. No wheezing, rhonchi or rales.   Abdominal:      General: Bowel sounds are normal.      Palpations: Abdomen is soft.      Tenderness: There is no abdominal tenderness. There is no guarding.      Hernia: No hernia is present.   Musculoskeletal:         General: No deformity. Normal range of motion.      Cervical back: Normal range of motion and neck supple.   Lymphadenopathy:      Cervical: No cervical adenopathy.   Skin:     General: Skin is warm and dry.      Findings: No lesion or rash.   Neurological:      General: No focal deficit present.      Mental Status: She is alert and oriented to person, place, and time.      Cranial Nerves: No cranial nerve deficit.      Sensory: No sensory deficit.      Motor: Motor function is intact.      Coordination: Coordination is intact.      Gait: Gait normal.      Deep Tendon Reflexes: Reflexes are normal and symmetric.   Psychiatric:          Attention and Perception: She is attentive.         Mood and Affect: Mood and affect normal.         Speech: Speech normal.         Behavior: Behavior normal.         Thought Content: Thought content normal.         Recent Results (from the past 672 hour(s))   CBC (No Diff)    Collection Time: 05/27/21 12:23 PM    Specimen: Blood   Result Value Ref Range    WBC 9.00 3.40 - 10.80 10*3/mm3    RBC 4.81 3.77 - 5.28 10*6/mm3    Hemoglobin 14.6 12.0 - 15.9 g/dL    Hematocrit 42.8 34.0 - 46.6 %    MCV 89.0 79.0 - 97.0 fL    MCH 30.4 26.6 - 33.0 pg    MCHC 34.1 31.5 - 35.7 g/dL    RDW 13.6 12.3 - 15.4 %    Platelets 322 140 - 450 10*3/mm3   Comprehensive Metabolic Panel    Collection Time: 05/27/21 12:23 PM    Specimen: Blood   Result Value Ref Range    Glucose 82 65 - 99 mg/dL    BUN 9 6 - 20 mg/dL    Creatinine 1.07 (H) 0.57 - 1.00 mg/dL    eGFR Non African Am 57 (L) >60 mL/min/1.73    eGFR African Am 69 >60 mL/min/1.73    BUN/Creatinine Ratio 8.4 7.0 - 25.0    Sodium 138 136 - 145 mmol/L    Potassium 4.4 3.5 - 5.2 mmol/L    Chloride 102 98 - 107 mmol/L    Total CO2 26.8 22.0 - 29.0 mmol/L    Calcium 9.6 8.6 - 10.5 mg/dL    Total Protein 6.8 6.0 - 8.5 g/dL    Albumin 4.30 3.50 - 5.20 g/dL    Globulin 2.5 gm/dL    A/G Ratio 1.7 g/dL    Total Bilirubin 0.2 0.0 - 1.2 mg/dL    Alkaline Phosphatase 19 (L) 39 - 117 U/L    AST (SGOT) 12 1 - 32 U/L    ALT (SGPT) 12 1 - 33 U/L   Lipid Panel With / Chol / HDL Ratio    Collection Time: 05/27/21 12:23 PM    Specimen: Blood   Result Value Ref Range    Total Cholesterol 144 0 - 200 mg/dL    Triglycerides 98 0 - 150 mg/dL    HDL Cholesterol 61 (H) 40 - 60 mg/dL    VLDL Cholesterol August 18 5 - 40 mg/dL    LDL Chol Calc (NIH) 65 0 - 100 mg/dL    Chol/HDL Ratio 2.36    Thyroid Cascade Profile    Collection Time: 05/27/21 12:23 PM    Specimen: Blood   Result Value Ref Range    TSH 1.390 0.450 - 4.500 uIU/mL   Vitamin D 25 Hydroxy    Collection Time: 05/27/21 12:23 PM    Specimen: Blood    Result Value Ref Range    25 Hydroxy, Vitamin D 27.2 (L) 30.0 - 100.0 ng/ml   Hepatitis C Antibody    Collection Time: 05/27/21 12:23 PM    Specimen: Blood   Result Value Ref Range    Hep C Virus Ab >11.0 (H) 0.0 - 0.9 s/co ratio     Each of these results were discussed individually in detail with the patient.      ASSESSMENT AND PLAN    Diagnoses and all orders for this visit:    1. Encounter for wellness examination in adult (Primary)    2. Essential hypertension  Comments:  - uncontrolled  - cont Norvasc  - add Lisinopril HCT    3. Vitamin D deficiency  Comments:  - new  - start OTC supp of 5000IU QD    4. Hepatitis C antibody test positive  Comments:  - new  Orders:  -     Hepatitis C RNA, Quantitative, PCR (graph); Future  -     HIV-1/O/2 Ag/Ab w Reflex; Future    5. Class 3 severe obesity due to excess calories with serious comorbidity and body mass index (BMI) of 40.0 to 44.9 in adult (CMS/[facility])  Comments:  - counseled regarding TLCs        Preventative counseling completed including relevant screenings, appropriate vaccinations, healthy nutrition, and appropriate physical activity.  Patient's Body mass index is 42.42 kg/m². indicating that she is obese (BMI >30). Obesity-related health conditions include the following: hypertension. Obesity is newly identified. BMI is is above average; BMI management plan is completed. We discussed low calorie, low carb based diet program, portion control and increasing exercise..          Medications, including side effects, were discussed with the patient. Patient verbalized understanding.  The plan of care was discussed. All questions were answered. Patient verbalized understanding.        Return in about 4 weeks (around 7/8/2021) for HTN., or sooner if needed.

## 2021-06-10 NOTE — PATIENT INSTRUCTIONS
Gynecology:  Dr. Daniela Berrios, APRN    [address] [facility], [address]   [phone]             Healthy Lifestyle: Nutrition and Exercise    How you nourish your body a critical to your overall health and well being. It is often said that food can be our most powerful medicine or most toxic poison depending on the choices we make.      Meal planning    It is important to plan your meals ahead of time. You will make better choices, instead of ordering a pizza or grabbing a bag of chips when you are starving.          At meals, imagine dividing your plate into fourths:  ? One-half of your plate is low-carbohydrate vegetables.  ? One-fourth of your plate is complex carbohydrates - whole grains and/or fruits.  ? One-fourth of your plate is good quality lean protein.      Meal planning  · Eat 3 meals and 2 snacks each day.  · Eat at set times. Allow at least 30 minutes for each meal.  · Limit carbohydrate intake to no more than 30 g per meal or 130 g per day.   · Include a protein-rich food at every meal and snack. Eat 60-80 g of protein a day when possible.  · EAT SLOWLY!  · Take small bites.  · Set your fork or spoon down between each bite and fully chew your food.  · When you feel like the next bite will make you full, STOP EATING.  · Eat your protein first. If you are still hungry after you have consumed your protein, then move on to eating your low-carbohydrate vegetable. If, after consuming both your protein and low carbohydrate vegetable, you are still hungry, then move on to your complex carbohydrate.      Cooking  · Use low-fat cooking methods, such as baking, broiling, boiling, or grilling.  · Cook using healthy fats, such as olive, sunflower, grapeseed, or avocado oil.  · Avoid adding extra salt, sugar, or fat to foods when cooking.        General instructions  · Stay hydrated! Drink at least 48-64 oz of water each day.  · For every 8 ounces of coffee  or tea you drink, you must consume an additional 8 ounces of water beyond the recommended 48-64 ounces to offset the diuretic effect.  · DO NOT DRINK BEVERAGES WITH CALORIES.  · Limit/avoid alcohol intake.  · Avoid foods that are high in fat or have added sugar.  · Take any vitamin supplements as directed by your health care provider.      Exercise  · Get 30-45 continuous minutes of aerobic/cardio activity 5-6 days per week.  · If you are not exercising at all, start with 15-20 minutes 3 days per week and gradually build up.

## 2021-06-12 LAB
HCV RNA SERPL NAA+PROBE-ACNC: NORMAL IU/ML
HIV 1+2 AB+HIV1 P24 AG SERPL QL IA: NON REACTIVE
TEST INFORMATION: NORMAL

## 2021-06-15 DIAGNOSIS — I10 ESSENTIAL HYPERTENSION: ICD-10-CM

## 2021-06-15 RX ORDER — AMLODIPINE BESYLATE 5 MG/1
5 TABLET ORAL DAILY
Qty: 30 TABLET | Refills: 0 | Status: SHIPPED | OUTPATIENT
Start: 2021-06-15 | End: 2021-10-12 | Stop reason: SDUPTHER

## 2021-07-19 ENCOUNTER — TELEPHONE (OUTPATIENT)
Dept: INTERNAL MEDICINE | Facility: CLINIC | Age: 41
End: 2021-07-19

## 2021-07-19 DIAGNOSIS — I10 ESSENTIAL HYPERTENSION: Primary | ICD-10-CM

## 2021-07-19 RX ORDER — LISINOPRIL AND HYDROCHLOROTHIAZIDE 25; 20 MG/1; MG/1
1 TABLET ORAL DAILY
Qty: 30 TABLET | Refills: 1 | Status: SHIPPED | OUTPATIENT
Start: 2021-07-19 | End: 2021-10-12 | Stop reason: SDUPTHER

## 2021-07-19 NOTE — TELEPHONE ENCOUNTER
Caller: Sofy Martines    Relationship: Self    Best call back number: [phone]    What was the call regarding: PATIENT STATES THAT AT HER LAST VISIT 06/10 SHE WAS SUPPOSE TO GET ANOTHER MEDICATION FOR BLOOD PRESSURE TO TAKE  IN ADDITION TO THE AMLODIPINE BUT IT NEVER GOT CALLED IN TO THE PHARMACY. PATIENT IS MONITORING HER BLOOD PRESSURE AND IT IS STAYING HIGH. PLEASE ADVISE.       [address] [address] [facility] - [phone]  - [phone]   [phone]

## 2021-08-06 ENCOUNTER — HOSPITAL ENCOUNTER (EMERGENCY)
Facility: HOSPITAL | Age: 41
Discharge: LEFT BEFORE DISCHARGE | End: 2021-08-06

## 2021-08-06 PROCEDURE — 99211 OFF/OP EST MAY X REQ PHY/QHP: CPT

## 2021-08-12 ENCOUNTER — OFFICE VISIT (OUTPATIENT)
Dept: INTERNAL MEDICINE | Facility: CLINIC | Age: 41
End: 2021-08-12

## 2021-08-12 VITALS
DIASTOLIC BLOOD PRESSURE: 70 MMHG | HEIGHT: 62 IN | WEIGHT: 221.8 LBS | OXYGEN SATURATION: 98 % | BODY MASS INDEX: 40.82 KG/M2 | HEART RATE: 115 BPM | TEMPERATURE: 97.1 F | SYSTOLIC BLOOD PRESSURE: 114 MMHG

## 2021-08-12 DIAGNOSIS — R07.9 CHEST PAIN, UNSPECIFIED TYPE: ICD-10-CM

## 2021-08-12 DIAGNOSIS — R41.840 LACK OF CONCENTRATION: ICD-10-CM

## 2021-08-12 DIAGNOSIS — R00.2 PALPITATIONS: ICD-10-CM

## 2021-08-12 DIAGNOSIS — I10 ESSENTIAL HYPERTENSION: Primary | ICD-10-CM

## 2021-08-12 PROCEDURE — 99214 OFFICE O/P EST MOD 30 MIN: CPT | Performed by: NURSE PRACTITIONER

## 2021-08-12 NOTE — PROGRESS NOTES
"Subjective   Sofysa CATERINA Martines is a 41 y.o. female presenting today for follow up of   Chief Complaint   Patient presents with   • Hypertension       History of Present Illness     Patient Active Problem List   Diagnosis   • Essential hypertension       Current Outpatient Medications on File Prior to Visit   Medication Sig   • amLODIPine (NORVASC) 5 MG tablet Take 1 tablet by mouth Daily.   • lisinopril-hydrochlorothiazide (PRINZIDE,ZESTORETIC) 20-25 MG per tablet Take 1 tablet by mouth Daily.     No current facility-administered medications on file prior to visit.      HTN - This is currently being managed w/ amlodipine and Lisinopril HCT. Home check = 170-180s/80-90s w/ wrist cuff. She has had 2 episodes of intermittent CP. This was sternal, lasting 20 or so minutes and did not radiate. One occurred w/ activity. The other occurred at rest and she attributes to stress. Denies SOB, HA. She affirms palpitations.    Struggling with attention and focus. Was diagnosed w/ ADHD as a child.    The following portions of the patient's history were reviewed and updated as appropriate: allergies, current medications, past family history, past medical history, past social history, past surgical history and problem list.      Objective   Vitals:    08/12/21 1555 08/12/21 1615   BP: 142/70 114/70   Pulse: 115    Temp: 97.1 °F (36.2 °C)    SpO2: 98%    Weight: 101 kg (221 lb 12.8 oz)    Height: 157.5 cm (62.01\")        BP Readings from Last 3 Encounters:   08/12/21 114/70   06/10/21 155/85   05/27/21 (!) 164/106        Wt Readings from Last 3 Encounters:   08/12/21 101 kg (221 lb 12.8 oz)   06/10/21 105 kg (232 lb)   05/27/21 103 kg (228 lb)        Body mass index is 40.56 kg/m².  Nursing notes and vitals reviewed.    Physical Exam  Constitutional:       General: She is not in acute distress.     Appearance: She is well-developed.   Neck:      Thyroid: No thyroid mass or thyromegaly.   Cardiovascular:      Rate and Rhythm: Regular " rhythm.      Heart sounds: S1 normal and S2 normal.   Pulmonary:      Effort: Pulmonary effort is normal.      Breath sounds: Normal breath sounds.   Musculoskeletal:      Cervical back: Neck supple.   Lymphadenopathy:      Cervical: No cervical adenopathy.   Neurological:      Mental Status: She is alert and oriented to person, place, and time.   Psychiatric:         Attention and Perception: She is attentive.         Behavior: Behavior normal.         Thought Content: Thought content normal.         No results found for this or any previous visit (from the past 672 hour(s)).      Assessment/Plan   Diagnoses and all orders for this visit:    1. Essential hypertension (Primary)  Comments:  - controlled by my check today  - I would like her to purchase an arm cuff for home monitoring.    2. Chest pain, unspecified type  -     Treadmill Stress Test; Future    3. Palpitations  -     Adult Transthoracic Echo Complete W/ Cont if Necessary Per Protocol; Future    4. Lack of concentration  -     Ambulatory Referral to Behavioral Health        Except as noted above, pt will continue current medications as noted in the medication list. I will continue to authorize refills as needed.      Medications, including side effects, were discussed with the patient. Patient verbalized understanding.  The plan of care was discussed. All questions were answered. Patient verbalized understanding.      Return after cardiac testing.

## 2021-08-23 ENCOUNTER — HOSPITAL ENCOUNTER (OUTPATIENT)
Dept: CARDIOLOGY | Facility: HOSPITAL | Age: 41
End: 2021-08-23

## 2021-08-23 ENCOUNTER — APPOINTMENT (OUTPATIENT)
Dept: CARDIOLOGY | Facility: HOSPITAL | Age: 41
End: 2021-08-23

## 2021-09-24 ENCOUNTER — HOSPITAL ENCOUNTER (EMERGENCY)
Facility: HOSPITAL | Age: 41
Discharge: HOME | End: 2021-09-24
Attending: EMERGENCY MEDICINE | Admitting: EMERGENCY MEDICINE

## 2021-09-24 VITALS
RESPIRATION RATE: 16 BRPM | OXYGEN SATURATION: 97 % | WEIGHT: 227 LBS | DIASTOLIC BLOOD PRESSURE: 101 MMHG | TEMPERATURE: 98.4 F | BODY MASS INDEX: 42.86 KG/M2 | SYSTOLIC BLOOD PRESSURE: 143 MMHG | HEIGHT: 61 IN | HEART RATE: 115 BPM

## 2021-09-24 DIAGNOSIS — N39.0 URINARY TRACT INFECTION WITHOUT HEMATURIA, SITE UNSPECIFIED: Primary | ICD-10-CM

## 2021-09-24 LAB
B-HCG UR QL: NEGATIVE
BACTERIA UR QL AUTO: ABNORMAL /HPF
BILIRUB UR QL STRIP: NEGATIVE
CLARITY UR: ABNORMAL
COLOR UR: YELLOW
GLUCOSE UR STRIP-MCNC: NEGATIVE MG/DL
HGB UR QL STRIP.AUTO: ABNORMAL
HYALINE CASTS UR QL AUTO: ABNORMAL /LPF
KETONES UR QL STRIP: ABNORMAL
LEUKOCYTE ESTERASE UR QL STRIP.AUTO: ABNORMAL
NITRITE UR QL STRIP: POSITIVE
PH UR STRIP.AUTO: 7 [PH] (ref 4.5–8)
PROT UR QL STRIP: ABNORMAL
RBC # UR: ABNORMAL /HPF
REF LAB TEST METHOD: ABNORMAL
SP GR UR STRIP: 1.03 (ref 1–1.03)
SQUAMOUS #/AREA URNS HPF: ABNORMAL /HPF
UROBILINOGEN UR QL STRIP: ABNORMAL
WBC UR QL AUTO: ABNORMAL /HPF

## 2021-09-24 PROCEDURE — 81025 URINE PREGNANCY TEST: CPT | Performed by: EMERGENCY MEDICINE

## 2021-09-24 PROCEDURE — 99283 EMERGENCY DEPT VISIT LOW MDM: CPT

## 2021-09-24 PROCEDURE — 99283 EMERGENCY DEPT VISIT LOW MDM: CPT | Performed by: EMERGENCY MEDICINE

## 2021-09-24 PROCEDURE — 81001 URINALYSIS AUTO W/SCOPE: CPT | Performed by: EMERGENCY MEDICINE

## 2021-09-24 PROCEDURE — 87086 URINE CULTURE/COLONY COUNT: CPT | Performed by: EMERGENCY MEDICINE

## 2021-09-24 RX ORDER — PHENAZOPYRIDINE HYDROCHLORIDE 100 MG/1
200 TABLET, FILM COATED ORAL ONCE
Status: COMPLETED | OUTPATIENT
Start: 2021-09-24 | End: 2021-09-24

## 2021-09-24 RX ORDER — CEFUROXIME AXETIL 250 MG/1
500 TABLET ORAL ONCE
Status: COMPLETED | OUTPATIENT
Start: 2021-09-24 | End: 2021-09-24

## 2021-09-24 RX ORDER — CEFUROXIME AXETIL 500 MG/1
500 TABLET ORAL 2 TIMES DAILY
Qty: 14 TABLET | Refills: 0 | Status: SHIPPED | OUTPATIENT
Start: 2021-09-24 | End: 2021-10-01

## 2021-09-24 RX ADMIN — PHENAZOPYRIDINE HYDROCHLORIDE 200 MG: 100 TABLET ORAL at 23:18

## 2021-09-24 RX ADMIN — CEFUROXIME AXETIL 500 MG: 250 TABLET, FILM COATED ORAL at 23:18

## 2021-09-25 NOTE — ED PROVIDER NOTES
Subjective   History of Present Illness  History of Present Illness    Chief complaint: Dysuria    Location: Bladder    Quality/Severity: Moderate pressure and burning    Timing/Duration: Present for the past couple of days    Modifying Factors: None    Narrative: This patient presents for evaluation of dysuria symptoms.  She describes some urgency and pressure and burning with urination.  She describes some discomfort in the low midline abdomen area.  There is no flank pain.  She has had some mild nausea but no vomiting.  She has not had any fevers today that she is aware of.  Her bowels have been moving normally.  She has not seen any blood in the urine or stools.    Associated Symptoms: As above    Review of Systems   Constitutional: Negative for activity change and fever.   HENT: Negative.    Eyes: Negative for pain and visual disturbance.   Respiratory: Negative for cough and shortness of breath.    Cardiovascular: Negative for chest pain.   Gastrointestinal: Positive for nausea. Negative for abdominal pain and vomiting.   Genitourinary: Positive for dysuria, frequency and urgency. Negative for flank pain and hematuria.   Skin: Negative for color change and rash.   Neurological: Negative for syncope and headaches.   All other systems reviewed and are negative.      Past Medical History:   Diagnosis Date   • Ankle pain, left     torn ligaments   • GERD (gastroesophageal reflux disease)    • Headache    • Hypertension        No Known Allergies    Past Surgical History:   Procedure Laterality Date   • APPENDECTOMY  1999   • DILATATION AND CURETTAGE  2004   • OOPHORECTOMY  2004   • TUBAL ABDOMINAL LIGATION  2005       Family History   Problem Relation Age of Onset   • No Known Problems Mother    • Other Father         Health History unknown       Social History     Socioeconomic History   • Marital status: Single     Spouse name: Not on file   • Number of children: Not on file   • Years of education: Not on file    • Highest education level: Not on file   Tobacco Use   • Smoking status: Former Smoker     Packs/day: 0.50     Types: Cigarettes   • Smokeless tobacco: Never Used   Vaping Use   • Vaping Use: Never used   Substance and Sexual Activity   • Alcohol use: No   • Drug use: No   • Sexual activity: Defer       ED Triage Vitals [09/24/21 2257]   Temp Heart Rate Resp BP SpO2   98.4 °F (36.9 °C) 115 16 (!) 143/101 97 %      Temp src Heart Rate Source Patient Position BP Location FiO2 (%)   -- Monitor Sitting Right arm --         Objective   Physical Exam  Vitals and nursing note reviewed.   Constitutional:       General: She is not in acute distress.     Appearance: She is well-developed. She is not toxic-appearing or diaphoretic.      Comments: Pleasant lady.  No apparent distress   HENT:      Head: Normocephalic and atraumatic.   Eyes:      General:         Right eye: No discharge.         Left eye: No discharge.      Pupils: Pupils are equal, round, and reactive to light.   Cardiovascular:      Rate and Rhythm: Normal rate and regular rhythm.      Pulses: Normal pulses.      Heart sounds: No murmur heard.     Pulmonary:      Effort: Pulmonary effort is normal. No respiratory distress.      Breath sounds: Normal breath sounds. No stridor. No wheezing or rhonchi.   Abdominal:      Palpations: Abdomen is soft.      Tenderness: There is abdominal tenderness. There is no right CVA tenderness, left CVA tenderness, guarding or rebound.      Hernia: No hernia is present.      Comments: Mild tenderness in the suprapubic region only.  No peritoneal features anywhere.   Musculoskeletal:         General: No deformity. Normal range of motion.      Cervical back: Normal range of motion and neck supple.   Skin:     General: Skin is warm and dry.      Coloration: Skin is not jaundiced.      Findings: No erythema or rash.   Neurological:      General: No focal deficit present.      Mental Status: She is alert and oriented to person,  place, and time.   Psychiatric:         Behavior: Behavior normal.         Thought Content: Thought content normal.         Judgment: Judgment normal.       Results for orders placed or performed during the hospital encounter of 09/24/21   Pregnancy, Urine - Urine, Clean Catch    Specimen: Urine, Clean Catch   Result Value Ref Range    HCG, Urine QL Negative Negative   Urinalysis With Culture If Indicated - Urine, Clean Catch    Specimen: Urine, Clean Catch   Result Value Ref Range    Color, UA Yellow Yellow, Straw    Appearance, UA Cloudy (A) Clear    pH, UA 7.0 4.5 - 8.0    Specific Gravity, UA 1.027 1.003 - 1.030    Glucose, UA Negative Negative    Ketones, UA Trace (A) Negative    Bilirubin, UA Negative Negative    Blood, UA Trace (A) Negative    Protein,  mg/dL (2+) (A) Negative    Leuk Esterase, UA Trace (A) Negative    Nitrite, UA Positive (A) Negative    Urobilinogen, UA 0.2 E.U./dL 0.2 - 1.0 E.U./dL   Urinalysis, Microscopic Only - Urine, Clean Catch    Specimen: Urine, Clean Catch   Result Value Ref Range    RBC, UA 0-2 (A) None Seen /HPF    WBC, UA Too Numerous to Count (A) None Seen /HPF    Bacteria, UA 2+ (A) None Seen /HPF    Squamous Epithelial Cells, UA 7-12 (A) None Seen, 0-2 /HPF    Hyaline Casts, UA None Seen None Seen /LPF    Methodology Manual Light Microscopy        Procedures           ED Course  ED Course as of Sep 25 0120   Fri Sep 24, 2021   6231 I reviewed the urinalysis which is quite consistent with UTI.  I started her on Ceftin therapy here.  Will discharge with 7 days of Ceftin antibiotics to keep taking.  Advised follow-up with PMD.    [ISABEL]      ED Course User Index  [ISABEL] Jason Brothers MD                                           MDM  Number of Diagnoses or Management Options     Amount and/or Complexity of Data Reviewed  Clinical lab tests: reviewed and ordered    Risk of Complications, Morbidity, and/or Mortality  Presenting problems: moderate  Diagnostic procedures:  low  Management options: moderate        Final diagnoses:   Urinary tract infection without hematuria, site unspecified       ED Disposition  ED Disposition     ED Disposition Condition Comment    Discharge Stable           Marli Astorga, APRN  [address]  [address]  [place] [address] [phone]    Schedule an appointment as soon as possible for a visit in 3 days  Follow-up with your PCP this week for repeat evaluation         Medication List      New Prescriptions    cefuroxime 500 MG tablet  Commonly known as: CEFTIN  Take 1 tablet by mouth 2 (Two) Times a Day for 7 days.           Where to Get Your Medications      These medications were sent to [facility] [address] [place] [address] - [address] [place] [address] - [phone] [facility] [phone]   [address] [address]    Phone: [phone]   · cefuroxime 500 MG tablet          Jason Brothers MD  09/25/21 0127

## 2021-09-25 NOTE — DISCHARGE INSTRUCTIONS
Take antibiotics as directed until completed.  Drink plenty water as tolerated.  Please return to the emergency room for any worsening pain, fevers, nausea, vomiting, difficulties urinating or any other concerns.

## 2021-09-26 LAB — BACTERIA SPEC AEROBE CULT: NORMAL

## 2021-10-12 ENCOUNTER — TELEPHONE (OUTPATIENT)
Dept: INTERNAL MEDICINE | Facility: CLINIC | Age: 41
End: 2021-10-12

## 2021-10-12 DIAGNOSIS — I10 ESSENTIAL HYPERTENSION: ICD-10-CM

## 2021-10-12 RX ORDER — AMLODIPINE BESYLATE 5 MG/1
5 TABLET ORAL DAILY
Qty: 30 TABLET | Refills: 0 | Status: SHIPPED | OUTPATIENT
Start: 2021-10-12

## 2021-10-12 RX ORDER — LISINOPRIL AND HYDROCHLOROTHIAZIDE 25; 20 MG/1; MG/1
1 TABLET ORAL DAILY
Qty: 30 TABLET | Refills: 0 | Status: SHIPPED | OUTPATIENT
Start: 2021-10-12

## 2021-10-12 NOTE — TELEPHONE ENCOUNTER
Rx Refill Note  Requested Prescriptions      No prescriptions requested or ordered in this encounter      Last office visit with prescribing clinician: 8/12/2021      Next office visit with prescribing clinician: Visit date not found            Dunia Ybarra MA  10/12/21, 15:32 EDT

## 2021-10-12 NOTE — TELEPHONE ENCOUNTER
Caller: Sofy Martines    Relationship: Self      Medication requested (name and dosage): amLODIPine (NORVASC) 5 MG tablet  lisinopril-hydrochlorothiazide (PRINZIDE,ZESTORETIC) 20-25 MG per   Pharmacy where request should be sent: [facility] [address] [place] [address] [place] - [address] [facility] 53 - [phone]  - [phone]         Best call back number: [phone]  Does the patient have less than a 3 day supply:  [x] Yes  [] No    Werner Dorantes Rep   10/12/21 15:22 EDT

## 2022-08-05 ENCOUNTER — APPOINTMENT (OUTPATIENT)
Dept: GENERAL RADIOLOGY | Facility: HOSPITAL | Age: 42
End: 2022-08-05

## 2022-08-05 ENCOUNTER — HOSPITAL ENCOUNTER (EMERGENCY)
Facility: HOSPITAL | Age: 42
Discharge: HOME | End: 2022-08-05
Attending: EMERGENCY MEDICINE | Admitting: EMERGENCY MEDICINE

## 2022-08-05 VITALS
BODY MASS INDEX: 31.41 KG/M2 | HEART RATE: 108 BPM | SYSTOLIC BLOOD PRESSURE: 175 MMHG | WEIGHT: 160 LBS | TEMPERATURE: 98.3 F | DIASTOLIC BLOOD PRESSURE: 104 MMHG | RESPIRATION RATE: 15 BRPM | HEIGHT: 60 IN | OXYGEN SATURATION: 99 %

## 2022-08-05 DIAGNOSIS — S50.01XA CONTUSION OF RIGHT ELBOW, INITIAL ENCOUNTER: Primary | ICD-10-CM

## 2022-08-05 PROCEDURE — 73080 X-RAY EXAM OF ELBOW: CPT

## 2022-08-05 PROCEDURE — 99282 EMERGENCY DEPT VISIT SF MDM: CPT

## 2022-08-05 NOTE — ED PROVIDER NOTES
Subjective   42-year-old female presents with right elbow pain.  Patient states 2 days ago [place] and slipped and fell landing on her right elbow.  No lacerations or abrasions.  Patient had immediate pain to her right elbow which has persisted since that time.  No numbness or tingling.  No weakness.  Flexion and extension of elbow are limited to some degree secondary to pain.  No tenderness to hand, wrist, shoulder.  No other injuries from the fall.  Patient has been taking ibuprofen which she reports provides no relief.          Review of Systems   Constitutional: Negative.    Respiratory: Negative.    Cardiovascular: Negative.    Musculoskeletal:        Per HPI   Skin: Negative.    Neurological: Negative.        Past Medical History:   Diagnosis Date   • Ankle pain, left     torn ligaments   • GERD (gastroesophageal reflux disease)    • Headache    • Hypertension        No Known Allergies    Past Surgical History:   Procedure Laterality Date   • APPENDECTOMY  1999   • DILATATION AND CURETTAGE  2004   • OOPHORECTOMY  2004   • TUBAL ABDOMINAL LIGATION  2005       Family History   Problem Relation Age of Onset   • No Known Problems Mother    • Other Father         Health History unknown       Social History     Socioeconomic History   • Marital status: Single   Tobacco Use   • Smoking status: Former Smoker     Packs/day: 0.50     Types: Cigarettes   • Smokeless tobacco: Never Used   Vaping Use   • Vaping Use: Never used   Substance and Sexual Activity   • Alcohol use: No   • Drug use: No   • Sexual activity: Defer           Objective   Physical Exam  Constitutional:       General: She is not in acute distress.     Appearance: She is not toxic-appearing.   HENT:      Head: Normocephalic and atraumatic.   Cardiovascular:      Rate and Rhythm: Normal rate and regular rhythm.      Pulses: Normal pulses.   Pulmonary:      Effort: Pulmonary effort is normal. No respiratory distress.   Musculoskeletal:       Comments: Flexion of right elbow limited to about 90 degrees, extension to 180, normal supination and pronation of forearm.  Normal range of motion of hand, wrist, shoulder.  Minimal tenderness to posterior elbow.  No obvious deformity.  No lacerations or abrasions.  No significant swelling or erythema.  Minimal ecchymosis over olecranon.  Hand is motor and sensory intact.  Remainder of extremities within normal limits.   Skin:     General: Skin is warm and dry.      Comments: Intact   Neurological:      General: No focal deficit present.      Mental Status: She is alert.      Sensory: No sensory deficit.      Motor: No weakness.   Psychiatric:         Mood and Affect: Mood normal.         Behavior: Behavior normal.         Thought Content: Thought content normal.         Judgment: Judgment normal.         Procedures           ED Course  ED Course as of 08/05/22 0425   Fri Aug 05, 2022   0424 X-ray negative.  Neurovascular intact.  Skin is intact.  Advised rest, ice, anti-inflammatories, primary care follow-up if not clearly improving in 1 to 2 weeks. [TD]      ED Course User Index  [TD] Ravi Newberry MD                                           [facility]    Final diagnoses:   Contusion of right elbow, initial encounter       ED Disposition  ED Disposition     ED Disposition   Discharge    Condition   Stable    Comment   --             Marli Astorga, APRN  [address]  [address]  [place] [id number] [phone]    In 1 week  As needed         Medication List      No changes were made to your prescriptions during this visit.          Ravi Newberry MD  08/05/22 0425

## 2022-12-21 ENCOUNTER — HOSPITAL ENCOUNTER (EMERGENCY)
Facility: HOSPITAL | Age: 42
Discharge: HOME | End: 2022-12-21
Attending: EMERGENCY MEDICINE | Admitting: EMERGENCY MEDICINE

## 2022-12-21 ENCOUNTER — APPOINTMENT (OUTPATIENT)
Dept: CT IMAGING | Facility: HOSPITAL | Age: 42
End: 2022-12-21

## 2022-12-21 VITALS
HEIGHT: 60 IN | TEMPERATURE: 98.5 F | BODY MASS INDEX: 45.94 KG/M2 | SYSTOLIC BLOOD PRESSURE: 157 MMHG | WEIGHT: 234 LBS | HEART RATE: 115 BPM | OXYGEN SATURATION: 99 % | RESPIRATION RATE: 12 BRPM | DIASTOLIC BLOOD PRESSURE: 106 MMHG

## 2022-12-21 DIAGNOSIS — R10.9 ACUTE RIGHT FLANK PAIN: Primary | ICD-10-CM

## 2022-12-21 LAB
ALBUMIN SERPL-MCNC: 3.7 G/DL (ref 3.5–5.2)
ALBUMIN/GLOB SERPL: 1.3 G/DL
ALP SERPL-CCNC: 22 U/L (ref 39–117)
ALT SERPL W P-5'-P-CCNC: 20 U/L (ref 1–33)
ANION GAP SERPL CALCULATED.3IONS-SCNC: 11.8 MMOL/L (ref 5–15)
AST SERPL-CCNC: 30 U/L (ref 1–32)
B-HCG UR QL: NEGATIVE
BASOPHILS # BLD AUTO: 0.07 10*3/MM3 (ref 0–0.2)
BASOPHILS NFR BLD AUTO: 0.6 % (ref 0–1.5)
BILIRUB SERPL-MCNC: 0.3 MG/DL (ref 0–1.2)
BILIRUB UR QL STRIP: NEGATIVE
BUN SERPL-MCNC: 16 MG/DL (ref 6–20)
BUN/CREAT SERPL: 16.8 (ref 7–25)
CALCIUM SPEC-SCNC: 8.7 MG/DL (ref 8.6–10.5)
CHLORIDE SERPL-SCNC: 101 MMOL/L (ref 98–107)
CLARITY UR: CLEAR
CO2 SERPL-SCNC: 24.2 MMOL/L (ref 22–29)
COLOR UR: YELLOW
CREAT SERPL-MCNC: 0.95 MG/DL (ref 0.57–1)
DEPRECATED RDW RBC AUTO: 43.2 FL (ref 37–54)
EGFRCR SERPLBLD CKD-EPI 2021: 77.8 ML/MIN/1.73
EOSINOPHIL # BLD AUTO: 0.26 10*3/MM3 (ref 0–0.4)
EOSINOPHIL NFR BLD AUTO: 2.3 % (ref 0.3–6.2)
ERYTHROCYTE [DISTWIDTH] IN BLOOD BY AUTOMATED COUNT: 13.2 % (ref 12.3–15.4)
GLOBULIN UR ELPH-MCNC: 2.9 GM/DL
GLUCOSE SERPL-MCNC: 89 MG/DL (ref 65–99)
GLUCOSE UR STRIP-MCNC: NEGATIVE MG/DL
HCT VFR BLD AUTO: 40.1 % (ref 34–46.6)
HGB BLD-MCNC: 13.2 G/DL (ref 12–15.9)
HGB UR QL STRIP.AUTO: NEGATIVE
IMM GRANULOCYTES # BLD AUTO: 0.02 10*3/MM3 (ref 0–0.05)
IMM GRANULOCYTES NFR BLD AUTO: 0.2 % (ref 0–0.5)
KETONES UR QL STRIP: NEGATIVE
LEUKOCYTE ESTERASE UR QL STRIP.AUTO: NEGATIVE
LIPASE SERPL-CCNC: 19 U/L (ref 13–60)
LYMPHOCYTES # BLD AUTO: 3.12 10*3/MM3 (ref 0.7–3.1)
LYMPHOCYTES NFR BLD AUTO: 27.5 % (ref 19.6–45.3)
MCH RBC QN AUTO: 29.7 PG (ref 26.6–33)
MCHC RBC AUTO-ENTMCNC: 32.9 G/DL (ref 31.5–35.7)
MCV RBC AUTO: 90.3 FL (ref 79–97)
MONOCYTES # BLD AUTO: 0.89 10*3/MM3 (ref 0.1–0.9)
MONOCYTES NFR BLD AUTO: 7.8 % (ref 5–12)
NEUTROPHILS NFR BLD AUTO: 6.98 10*3/MM3 (ref 1.7–7)
NEUTROPHILS NFR BLD AUTO: 61.6 % (ref 42.7–76)
NITRITE UR QL STRIP: NEGATIVE
NRBC BLD AUTO-RTO: 0 /100 WBC (ref 0–0.2)
PH UR STRIP.AUTO: <=5 [PH] (ref 4.5–8)
PLATELET # BLD AUTO: 293 10*3/MM3 (ref 140–450)
PMV BLD AUTO: 9.8 FL (ref 6–12)
POTASSIUM SERPL-SCNC: 3.7 MMOL/L (ref 3.5–5.2)
PROT SERPL-MCNC: 6.6 G/DL (ref 6–8.5)
PROT UR QL STRIP: NEGATIVE
RBC # BLD AUTO: 4.44 10*6/MM3 (ref 3.77–5.28)
SODIUM SERPL-SCNC: 137 MMOL/L (ref 136–145)
SP GR UR STRIP: 1.03 (ref 1–1.03)
UROBILINOGEN UR QL STRIP: NORMAL
WBC NRBC COR # BLD: 11.34 10*3/MM3 (ref 3.4–10.8)

## 2022-12-21 PROCEDURE — 96374 THER/PROPH/DIAG INJ IV PUSH: CPT

## 2022-12-21 PROCEDURE — 81025 URINE PREGNANCY TEST: CPT | Performed by: EMERGENCY MEDICINE

## 2022-12-21 PROCEDURE — 74176 CT ABD & PELVIS W/O CONTRAST: CPT

## 2022-12-21 PROCEDURE — 25010000002 KETOROLAC TROMETHAMINE PER 15 MG: Performed by: EMERGENCY MEDICINE

## 2022-12-21 PROCEDURE — 36415 COLL VENOUS BLD VENIPUNCTURE: CPT

## 2022-12-21 PROCEDURE — 83690 ASSAY OF LIPASE: CPT | Performed by: EMERGENCY MEDICINE

## 2022-12-21 PROCEDURE — 99283 EMERGENCY DEPT VISIT LOW MDM: CPT

## 2022-12-21 PROCEDURE — 85025 COMPLETE CBC W/AUTO DIFF WBC: CPT | Performed by: EMERGENCY MEDICINE

## 2022-12-21 PROCEDURE — 80053 COMPREHEN METABOLIC PANEL: CPT | Performed by: EMERGENCY MEDICINE

## 2022-12-21 PROCEDURE — 81003 URINALYSIS AUTO W/O SCOPE: CPT | Performed by: EMERGENCY MEDICINE

## 2022-12-21 RX ORDER — SODIUM CHLORIDE 0.9 % (FLUSH) 0.9 %
10 SYRINGE (ML) INJECTION AS NEEDED
Status: DISCONTINUED | OUTPATIENT
Start: 2022-12-21 | End: 2022-12-21 | Stop reason: HOSPADM

## 2022-12-21 RX ORDER — TRAMADOL HYDROCHLORIDE 50 MG/1
TABLET ORAL
Qty: 20 TABLET | Refills: 0 | Status: SHIPPED | OUTPATIENT
Start: 2022-12-21

## 2022-12-21 RX ORDER — CYCLOBENZAPRINE HCL 10 MG
10 TABLET ORAL 3 TIMES DAILY PRN
Qty: 24 TABLET | Refills: 0 | Status: SHIPPED | OUTPATIENT
Start: 2022-12-21

## 2022-12-21 RX ORDER — DICLOFENAC SODIUM 75 MG/1
75 TABLET, DELAYED RELEASE ORAL 2 TIMES DAILY PRN
Qty: 20 TABLET | Refills: 0 | Status: SHIPPED | OUTPATIENT
Start: 2022-12-21

## 2022-12-21 RX ORDER — KETOROLAC TROMETHAMINE 30 MG/ML
30 INJECTION, SOLUTION INTRAMUSCULAR; INTRAVENOUS ONCE
Status: COMPLETED | OUTPATIENT
Start: 2022-12-21 | End: 2022-12-21

## 2022-12-21 RX ORDER — CYCLOBENZAPRINE HCL 10 MG
10 TABLET ORAL ONCE
Status: COMPLETED | OUTPATIENT
Start: 2022-12-21 | End: 2022-12-21

## 2022-12-21 RX ADMIN — CYCLOBENZAPRINE 10 MG: 10 TABLET, FILM COATED ORAL at 04:08

## 2022-12-21 RX ADMIN — KETOROLAC TROMETHAMINE 30 MG: 30 INJECTION, SOLUTION INTRAMUSCULAR; INTRAVENOUS at 02:55

## 2022-12-21 NOTE — ED PROVIDER NOTES
Subjective     History provided by:  Patient    History of Present Illness    · Chief complaint: Flank pain    · Location: Right Schlein    · Quality/Severity: Pain is severe 8/10 in intensity.    · Timing/Onset: Started 5 hours ago.    · Modifying Factors: Movement exacerbates pain.  Nothing alleviates pain.    · Associated symptoms: Denies associated dysuria, urinary frequency, urinary urgency, hematuria, nausea, vomiting, diarrhea, or fever.    · Narrative: The patient is a 6-year-old white female complaining of a 5-hour history of right flank pain.  Denies any abdominal pain.  Denies a history of similar pain.  Past medical history significant for hypertension.  Last menstrual period was a month ago and she is status post bilateral tubal ligation and 1 ovary removed.  Status post appendectomy.  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    Review of Systems   Constitutional: Negative for activity change, appetite change, chills, diaphoresis, fatigue and fever.   HENT: Negative for congestion, dental problem, ear pain, hearing loss, mouth sores, postnasal drip, rhinorrhea, sinus pressure, sore throat and voice change.    Eyes: Negative for photophobia, pain, discharge, redness and visual disturbance.   Respiratory: Negative for cough, chest tightness, shortness of breath, wheezing and stridor.    Cardiovascular: Negative for chest pain, palpitations and leg swelling.   Gastrointestinal: Negative for abdominal pain, diarrhea, nausea and vomiting.   Genitourinary: Positive for flank pain. Negative for difficulty urinating, dysuria, frequency, hematuria, pelvic pain, urgency and vaginal discharge.   Musculoskeletal: Negative for arthralgias, back pain, gait problem, joint swelling, myalgias, neck pain and neck stiffness.   Skin: Negative for color change and rash.   Neurological: Negative for dizziness, tremors, seizures, syncope, facial asymmetry, speech difficulty, weakness, light-headedness,  "numbness and headaches.   Hematological: Negative for adenopathy.   Psychiatric/Behavioral: Positive for sleep disturbance. Negative for confusion and decreased concentration. The patient is not nervous/anxious.      Past Medical History:   Diagnosis Date   • Ankle pain, left     torn ligaments   • GERD (gastroesophageal reflux disease)    • Headache    • Hypertension      BP (!) 157/106 (BP Location: Right arm)   Pulse 115   Temp 98.5 °F (36.9 °C) (Oral)   Resp 12   Ht 152.4 cm (60\")   Wt 106 kg (234 lb)   LMP 11/21/2022   SpO2 99%   BMI 45.70 kg/m²     Past Medical History:   Diagnosis Date   • Ankle pain, left     torn ligaments   • GERD (gastroesophageal reflux disease)    • Headache    • Hypertension        No Known Allergies    Past Surgical History:   Procedure Laterality Date   • APPENDECTOMY  1999   • DILATATION AND CURETTAGE  2004   • OOPHORECTOMY  2004   • TUBAL ABDOMINAL LIGATION  2005       Family History   Problem Relation Age of Onset   • No Known Problems Mother    • Other Father         Health History unknown       Social History     Socioeconomic History   • Marital status: Single   Tobacco Use   • Smoking status: Former     Packs/day: 0.50     Types: Cigarettes   • Smokeless tobacco: Never   Vaping Use   • Vaping Use: Never used   Substance and Sexual Activity   • Alcohol use: No   • Drug use: No   • Sexual activity: Defer           Objective   Physical Exam  Vitals and nursing note reviewed.   Constitutional:       General: She is in acute distress.      Appearance: She is well-developed. She is obese. She is not toxic-appearing or diaphoretic.      Comments: The patient appears in discomfort.  She does not appear toxic.  Review of her vital signs: She is afebrile with a temperature 98.5, respirations normal 12 with a normal room air oxygen saturation of 99%, tachycardic with a heart rate of 115, blood pressure elevated 157/106.   HENT:      Head: Normocephalic and atraumatic.      Nose: " Nose normal.      Mouth/Throat:      Mouth: Mucous membranes are moist.      Pharynx: Oropharynx is clear.   Eyes:      General: No scleral icterus.        Right eye: No discharge.         Left eye: No discharge.      Pupils: Pupils are equal, round, and reactive to light.   Neck:      Thyroid: No thyromegaly.      Vascular: No JVD.   Cardiovascular:      Rate and Rhythm: Normal rate and regular rhythm.      Heart sounds: Normal heart sounds. No murmur heard.  Pulmonary:      Effort: Pulmonary effort is normal.      Breath sounds: Normal breath sounds. No wheezing, rhonchi or rales.   Chest:      Chest wall: No tenderness.   Abdominal:      General: Bowel sounds are normal. There is no distension.      Palpations: Abdomen is soft.      Tenderness: There is no abdominal tenderness. There is right CVA tenderness.   Musculoskeletal:         General: No tenderness or deformity. Normal range of motion.      Cervical back: Normal range of motion and neck supple.   Lymphadenopathy:      Cervical: No cervical adenopathy.   Skin:     General: Skin is warm and dry.      Capillary Refill: Capillary refill takes less than 2 seconds.      Coloration: Skin is not pale.      Findings: No erythema or rash.   Neurological:      General: No focal deficit present.      Mental Status: She is alert and oriented to person, place, and time.      Cranial Nerves: No cranial nerve deficit.      Coordination: Coordination normal.      Comments: No focal motor sensory deficit   Psychiatric:         Thought Content: Thought content normal.         Judgment: Judgment normal.      Comments: Mood and affect consistent with being in pain.         Procedures           ED Course  ED Course as of 12/21/22 0420   Wed Dec 21, 2022   9752 Review of the patient's laboratory studies: The patient is urine is negative for blood or infection.  Her CBC is a slightly elevated white count of 11.3 with a normal differential.  Hemoglobin, hematocrit and platelets  within normal limits.  CMP has normal electrolytes, normal renal and liver function test.  Lipase is normal. [TP]   0355 The patient CT of the abdomen pelvis without contrast shows cholelithiasis without evidence of inflammation or cholecystitis.  The kidneys are normal and there is no evidence of urinary tract stones or hydronephrosis.  The appendix is normal.  No acute abnormalities were seen in the abdomen and pelvis. [TP]   0402 The patient's CT and laboratory evidence showed no evidence of urinary tract stones or infection causing her discomfort.  No pathology was identified.  By process of elimination I must assume that her right flank pain is musculoskeletal in etiology.  She was administered Toradol IV for her discomfort which had minimal effect.  She will be administered Flexeril prior to discharge.  She will be prescribed Flexeril, Toradol and Ultram.  She is to follow-up with her PCP.  The patient states she is not taking her blood pressure medication yet, so she was instructed to do so. [TP]      ED Course User Index  [TP] Ravi Carrion MD                                   [facility] reviewed by Ravi Carrion MD       MDM  Number of Diagnoses or Management Options  Acute right flank pain: new and requires workup     Amount and/or Complexity of Data Reviewed  Clinical lab tests: ordered and reviewed  Tests in the radiology section of CPT®: ordered and reviewed    Risk of Complications, Morbidity, and/or Mortality  Presenting problems: high  Diagnostic procedures: high  Management options: high  General comments: My differential diagnosis for back pain includes but is not limited to:  Musculoskeletal strain, contusion, retroperitoneal hematoma, disc protrusion, vertebral fracture, transverse process fracture, rib fracture, facet syndrome, sacroiliac joint strain, sciatica, renal injury, splenic injury, pancreatic injury, osteoarthritis, lumbar spondylosis, spinal stenosis, ankylosing spondylitis,  sacroiliac joint inflammation, pancreatitis, perforated peptic ulcer, diverticulitis, endometriosis, chronic PID, epidural abscess, osteomyelitis, retroperitoneal abscess, pyelonephritis, pneumonia, subphrenic abscess, tuberculosis, neurofibroma, meningioma, multiple myeloma, lymphoma, metastatic cancer, primary cancer, AAA, aortic dissection, spinal ischemia, referred pain, ureterolithiasis    Patient Progress  Patient progress: stable      Final diagnoses:   Acute right flank pain       ED Disposition  ED Disposition     ED Disposition   Discharge    Condition   Stable    Comment   --             Marli Astorga, APRN  [address]  [address]  [place] [address] [phone]    Schedule an appointment as soon as possible for a visit in 3 days  If not improved         Medication List      New Prescriptions    cyclobenzaprine 10 MG tablet  Commonly known as: FLEXERIL  Take 1 tablet by mouth 3 (Three) Times a Day As Needed for Muscle Spasms for up to 24 doses.     diclofenac 75 MG EC tablet  Commonly known as: VOLTAREN  Take 1 tablet by mouth 2 (Two) Times a Day As Needed (Pain) for up to 20 doses.     traMADol 50 MG tablet  Commonly known as: ULTRAM  1 to 2 tablets p.o. every 6 hours as needed moderate pain.           Where to Get Your Medications      These medications were sent to [facility] [id number] - [place] - [address] - [phone]  - [phone] FX [address] [address]    Phone: [phone]   · cyclobenzaprine 10 MG tablet  · diclofenac 75 MG EC tablet  · traMADol 50 MG tablet         Labs Reviewed   COMPREHENSIVE METABOLIC PANEL - Abnormal; Notable for the following components:       Result Value    Alkaline Phosphatase 22 (*)     All other components within normal limits    Narrative:     GFR Normal >60  Chronic Kidney Disease <60  Kidney Failure <15     CBC WITH AUTO DIFFERENTIAL - Abnormal; Notable for the following components:    WBC 11.34 (*)     Lymphocytes,  Absolute 3.12 (*)     All other components within normal limits   URINALYSIS W/ MICROSCOPIC IF INDICATED (NO CULTURE) - Normal    Narrative:     Urine microscopic not indicated.   LIPASE - Normal   PREGNANCY, URINE - Normal   CBC AND DIFFERENTIAL    Narrative:     The following orders were created for panel order CBC & Differential.  Procedure                               Abnormality         Status                     ---------                               -----------         ------                     CBC Auto Differential[[id number]]        Abnormal            Final result                 Please view results for these tests on the individual orders.     CT Abdomen Pelvis Without Contrast   Final Result   Cholelithiasis, unchanged. No acute or inflammatory changes are seen in the abdomen or pelvis.               Signer Name: Konrad Hsieh MD    Signed: 12/21/2022 3:42 AM    Workstation Name: [email or web address]     [facility]             Medication List      New Prescriptions    cyclobenzaprine 10 MG tablet  Commonly known as: FLEXERIL  Take 1 tablet by mouth 3 (Three) Times a Day As Needed for Muscle Spasms for up to 24 doses.     diclofenac 75 MG EC tablet  Commonly known as: VOLTAREN  Take 1 tablet by mouth 2 (Two) Times a Day As Needed (Pain) for up to 20 doses.     traMADol 50 MG tablet  Commonly known as: ULTRAM  1 to 2 tablets p.o. every 6 hours as needed moderate pain.           Where to Get Your Medications      These medications were sent to [facility] [id number] - [place] - [address] - [phone]  - [phone] FX  [address] [facility] [place] [address]    Phone: [phone]   · cyclobenzaprine 10 MG tablet  · diclofenac 75 MG EC tablet  · traMADol 50 MG tablet              Ravi Carrion MD  12/21/22 0421

## 2023-06-06 ENCOUNTER — APPOINTMENT (OUTPATIENT)
Dept: CT IMAGING | Facility: HOSPITAL | Age: 43
End: 2023-06-06
Payer: COMMERCIAL

## 2023-06-06 ENCOUNTER — APPOINTMENT (OUTPATIENT)
Dept: ULTRASOUND IMAGING | Facility: HOSPITAL | Age: 43
End: 2023-06-06
Payer: COMMERCIAL

## 2023-06-06 ENCOUNTER — HOSPITAL ENCOUNTER (EMERGENCY)
Facility: HOSPITAL | Age: 43
Discharge: HOME | End: 2023-06-06
Attending: EMERGENCY MEDICINE | Admitting: EMERGENCY MEDICINE
Payer: COMMERCIAL

## 2023-06-06 VITALS
DIASTOLIC BLOOD PRESSURE: 120 MMHG | HEART RATE: 95 BPM | HEIGHT: 60 IN | RESPIRATION RATE: 16 BRPM | WEIGHT: 200 LBS | SYSTOLIC BLOOD PRESSURE: 196 MMHG | TEMPERATURE: 98.3 F | OXYGEN SATURATION: 97 % | BODY MASS INDEX: 39.27 KG/M2

## 2023-06-06 DIAGNOSIS — K80.20 CALCULUS OF GALLBLADDER WITHOUT CHOLECYSTITIS WITHOUT OBSTRUCTION: Primary | ICD-10-CM

## 2023-06-06 LAB
ALBUMIN SERPL-MCNC: 4.1 G/DL (ref 3.5–5.2)
ALBUMIN/GLOB SERPL: 1.5 G/DL
ALP SERPL-CCNC: 18 U/L (ref 39–117)
ALT SERPL W P-5'-P-CCNC: 15 U/L (ref 1–33)
ANION GAP SERPL CALCULATED.3IONS-SCNC: 10.2 MMOL/L (ref 5–15)
AST SERPL-CCNC: 16 U/L (ref 1–32)
B-HCG UR QL: NEGATIVE
BASOPHILS # BLD AUTO: 0.06 10*3/MM3 (ref 0–0.2)
BASOPHILS NFR BLD AUTO: 0.8 % (ref 0–1.5)
BILIRUB SERPL-MCNC: 0.2 MG/DL (ref 0–1.2)
BILIRUB UR QL STRIP: NEGATIVE
BUN SERPL-MCNC: 12 MG/DL (ref 6–20)
BUN/CREAT SERPL: 11 (ref 7–25)
CALCIUM SPEC-SCNC: 9.4 MG/DL (ref 8.6–10.5)
CHLORIDE SERPL-SCNC: 98 MMOL/L (ref 98–107)
CLARITY UR: CLEAR
CO2 SERPL-SCNC: 26.8 MMOL/L (ref 22–29)
COLOR UR: YELLOW
CREAT SERPL-MCNC: 1.09 MG/DL (ref 0.57–1)
DEPRECATED RDW RBC AUTO: 44.6 FL (ref 37–54)
EGFRCR SERPLBLD CKD-EPI 2021: 65.6 ML/MIN/1.73
EOSINOPHIL # BLD AUTO: 0.23 10*3/MM3 (ref 0–0.4)
EOSINOPHIL NFR BLD AUTO: 2.9 % (ref 0.3–6.2)
ERYTHROCYTE [DISTWIDTH] IN BLOOD BY AUTOMATED COUNT: 13.7 % (ref 12.3–15.4)
GLOBULIN UR ELPH-MCNC: 2.8 GM/DL
GLUCOSE SERPL-MCNC: 96 MG/DL (ref 65–99)
GLUCOSE UR STRIP-MCNC: NEGATIVE MG/DL
HCT VFR BLD AUTO: 41.8 % (ref 34–46.6)
HGB BLD-MCNC: 13.8 G/DL (ref 12–15.9)
HGB UR QL STRIP.AUTO: NEGATIVE
IMM GRANULOCYTES # BLD AUTO: 0.02 10*3/MM3 (ref 0–0.05)
IMM GRANULOCYTES NFR BLD AUTO: 0.3 % (ref 0–0.5)
KETONES UR QL STRIP: NEGATIVE
LEUKOCYTE ESTERASE UR QL STRIP.AUTO: NEGATIVE
LIPASE SERPL-CCNC: 17 U/L (ref 13–60)
LYMPHOCYTES # BLD AUTO: 3.04 10*3/MM3 (ref 0.7–3.1)
LYMPHOCYTES NFR BLD AUTO: 38.7 % (ref 19.6–45.3)
MCH RBC QN AUTO: 29.3 PG (ref 26.6–33)
MCHC RBC AUTO-ENTMCNC: 33 G/DL (ref 31.5–35.7)
MCV RBC AUTO: 88.7 FL (ref 79–97)
MONOCYTES # BLD AUTO: 0.59 10*3/MM3 (ref 0.1–0.9)
MONOCYTES NFR BLD AUTO: 7.5 % (ref 5–12)
NEUTROPHILS NFR BLD AUTO: 3.91 10*3/MM3 (ref 1.7–7)
NEUTROPHILS NFR BLD AUTO: 49.8 % (ref 42.7–76)
NITRITE UR QL STRIP: NEGATIVE
NRBC BLD AUTO-RTO: 0 /100 WBC (ref 0–0.2)
PH UR STRIP.AUTO: 6 [PH] (ref 4.5–8)
PLATELET # BLD AUTO: 302 10*3/MM3 (ref 140–450)
PMV BLD AUTO: 9.7 FL (ref 6–12)
POTASSIUM SERPL-SCNC: 3.6 MMOL/L (ref 3.5–5.2)
PROT SERPL-MCNC: 6.9 G/DL (ref 6–8.5)
PROT UR QL STRIP: NEGATIVE
RBC # BLD AUTO: 4.71 10*6/MM3 (ref 3.77–5.28)
SODIUM SERPL-SCNC: 135 MMOL/L (ref 136–145)
SP GR UR STRIP: 1.02 (ref 1–1.03)
UROBILINOGEN UR QL STRIP: NORMAL
WBC NRBC COR # BLD: 7.85 10*3/MM3 (ref 3.4–10.8)

## 2023-06-06 PROCEDURE — 25510000001 IOPAMIDOL PER 1 ML

## 2023-06-06 PROCEDURE — 25010000002 ONDANSETRON PER 1 MG

## 2023-06-06 PROCEDURE — 80053 COMPREHEN METABOLIC PANEL: CPT

## 2023-06-06 PROCEDURE — 81025 URINE PREGNANCY TEST: CPT

## 2023-06-06 PROCEDURE — 96374 THER/PROPH/DIAG INJ IV PUSH: CPT

## 2023-06-06 PROCEDURE — 76705 ECHO EXAM OF ABDOMEN: CPT

## 2023-06-06 PROCEDURE — 85025 COMPLETE CBC W/AUTO DIFF WBC: CPT

## 2023-06-06 PROCEDURE — 83690 ASSAY OF LIPASE: CPT

## 2023-06-06 PROCEDURE — 81003 URINALYSIS AUTO W/O SCOPE: CPT

## 2023-06-06 PROCEDURE — 99283 EMERGENCY DEPT VISIT LOW MDM: CPT

## 2023-06-06 PROCEDURE — 74177 CT ABD & PELVIS W/CONTRAST: CPT

## 2023-06-06 RX ORDER — SODIUM CHLORIDE 0.9 % (FLUSH) 0.9 %
10 SYRINGE (ML) INJECTION AS NEEDED
Status: DISCONTINUED | OUTPATIENT
Start: 2023-06-06 | End: 2023-06-06 | Stop reason: HOSPADM

## 2023-06-06 RX ORDER — ONDANSETRON 4 MG/1
4 TABLET, ORALLY DISINTEGRATING ORAL EVERY 8 HOURS PRN
Qty: 30 TABLET | Refills: 0 | Status: SHIPPED | OUTPATIENT
Start: 2023-06-06

## 2023-06-06 RX ORDER — ONDANSETRON 2 MG/ML
4 INJECTION INTRAMUSCULAR; INTRAVENOUS ONCE
Status: COMPLETED | OUTPATIENT
Start: 2023-06-06 | End: 2023-06-06

## 2023-06-06 RX ORDER — DICYCLOMINE HCL 20 MG
20 TABLET ORAL EVERY 8 HOURS PRN
Qty: 30 TABLET | Refills: 0 | Status: SHIPPED | OUTPATIENT
Start: 2023-06-06

## 2023-06-06 RX ADMIN — IOPAMIDOL 100 ML: 755 INJECTION, SOLUTION INTRAVENOUS at 16:36

## 2023-06-06 RX ADMIN — SODIUM CHLORIDE 1000 ML: 9 INJECTION, SOLUTION INTRAVENOUS at 14:48

## 2023-06-06 RX ADMIN — ONDANSETRON 4 MG: 2 INJECTION INTRAMUSCULAR; INTRAVENOUS at 14:48

## 2023-06-06 NOTE — ED PROVIDER NOTES
EMERGENCY DEPARTMENT ENCOUNTER      Room Number: 09/09    History is provided by the patient, no translation services needed    HPI:    Chief complaint: Abdominal pain    Location: Upper abdomen, worse in the epigastric region    Quality/Severity: Patient describes the pain as a cramping/bloating pain that she rates an 8 out of 10.    Timing/Duration: 2-3 days    Modifying Factors: Eating makes the pain worse.    Associated Symptoms: Nausea    Narrative: Pt is a 43 y.o. female who presents complaining of upper abdominal pain x2-3 days.  She states that the pain is worse in her epigastric region.  She describes the pain as a cramping/bloating pain that she rates an 8 out of 10.  She has had associated nausea but denies any vomiting or diarrhea.  The patient states that eating does make the pain worse.  She denies any dysuria, hematuria, or blood in her stool.  She denies any fevers or chills.  She has not had any chest pain, shortness of breath, cough, dizziness, vision changes, neck pain, or back pain.      PMD: Marli Astorga APRN    REVIEW OF SYSTEMS  Review of Systems   Constitutional:  Negative for chills and fever.   Eyes:  Negative for photophobia and visual disturbance.   Respiratory:  Negative for cough, chest tightness and shortness of breath.    Cardiovascular:  Negative for chest pain, palpitations and leg swelling.   Gastrointestinal:  Positive for abdominal pain (Epigastric) and nausea. Negative for blood in stool, constipation, diarrhea and vomiting.   Genitourinary:  Negative for difficulty urinating, dysuria, flank pain and hematuria.   Musculoskeletal:  Negative for back pain, gait problem and neck pain.   Skin:  Negative for color change, pallor, rash and wound.   Neurological:  Negative for dizziness, syncope, weakness, numbness and headaches.   Psychiatric/Behavioral:  Negative for confusion. The patient is not nervous/anxious.        PAST MEDICAL HISTORY  Active Ambulatory Problems      Diagnosis Date Noted    Essential hypertension 05/27/2021     Resolved Ambulatory Problems     Diagnosis Date Noted    Left ankle injury, initial encounter 11/27/2019     Past Medical History:   Diagnosis Date    Ankle pain, left     GERD (gastroesophageal reflux disease)     Headache     Hypertension        PAST SURGICAL HISTORY  Past Surgical History:   Procedure Laterality Date    APPENDECTOMY  1999    DILATATION AND CURETTAGE  2004    OOPHORECTOMY  2004    TUBAL ABDOMINAL LIGATION  2005       FAMILY HISTORY  Family History   Problem Relation Age of Onset    No Known Problems Mother     Other Father         Health History unknown       SOCIAL HISTORY  Social History     Socioeconomic History    Marital status: Single   Tobacco Use    Smoking status: Former     Packs/day: 0.50     Types: Cigarettes    Smokeless tobacco: Never   Vaping Use    Vaping Use: Never used   Substance and Sexual Activity    Alcohol use: No    Drug use: No    Sexual activity: Defer       ALLERGIES  Patient has no known allergies.      Current Facility-Administered Medications:     [COMPLETED] Insert Peripheral IV, , , Once **AND** sodium chloride 0.9 % flush 10 mL, 10 mL, Intravenous, PRN, Hannah Bucio PA-C    Current Outpatient Medications:     amLODIPine (NORVASC) 5 MG tablet, Take 1 tablet by mouth Daily., Disp: 30 tablet, Rfl: 0    cyclobenzaprine (FLEXERIL) 10 MG tablet, Take 1 tablet by mouth 3 (Three) Times a Day As Needed for Muscle Spasms for up to 24 doses., Disp: 24 tablet, Rfl: 0    diclofenac (VOLTAREN) 75 MG EC tablet, Take 1 tablet by mouth 2 (Two) Times a Day As Needed (Pain) for up to 20 doses., Disp: 20 tablet, Rfl: 0    dicyclomine (BENTYL) 20 MG tablet, Take 1 tablet by mouth Every 8 (Eight) Hours As Needed for Abdominal Cramping., Disp: 30 tablet, Rfl: 0    lisinopril-hydrochlorothiazide (PRINZIDE,ZESTORETIC) 20-25 MG per tablet, Take 1 tablet by mouth Daily., Disp: 30 tablet, Rfl: 0    ondansetron ODT (ZOFRAN-ODT) 4  MG disintegrating tablet, Place 1 tablet on the tongue Every 8 (Eight) Hours As Needed for Nausea or Vomiting., Disp: 30 tablet, Rfl: 0    traMADol (ULTRAM) 50 MG tablet, 1 to 2 tablets p.o. every 6 hours as needed moderate pain., Disp: 20 tablet, Rfl: 0    PHYSICAL EXAM  ED Triage Vitals [06/06/23 1423]   Temp Heart Rate Resp BP SpO2   98.3 °F (36.8 °C) 95 16 (!) 196/120 97 %      Temp src Heart Rate Source Patient Position BP Location FiO2 (%)   Oral -- -- -- --       Physical Exam  Vitals and nursing note reviewed.   Constitutional:       General: She is not in acute distress.     Appearance: She is well-developed. She is not ill-appearing, toxic-appearing or diaphoretic.   HENT:      Head: Normocephalic and atraumatic.   Eyes:      General: No scleral icterus.     Extraocular Movements: Extraocular movements intact.      Conjunctiva/sclera: Conjunctivae normal.      Pupils: Pupils are equal, round, and reactive to light.   Cardiovascular:      Rate and Rhythm: Normal rate and regular rhythm.      Heart sounds: Normal heart sounds.     No friction rub.   Pulmonary:      Effort: Pulmonary effort is normal. No respiratory distress.      Breath sounds: Normal breath sounds. No stridor. No wheezing, rhonchi or rales.   Chest:      Chest wall: No tenderness.   Abdominal:      General: Bowel sounds are normal. There is no distension.      Palpations: Abdomen is soft.      Tenderness: There is abdominal tenderness in the epigastric area. There is no guarding or rebound. Negative signs include Covington's sign, Rovsing's sign and McBurney's sign.   Musculoskeletal:         General: Normal range of motion.      Cervical back: Normal range of motion and neck supple.   Skin:     General: Skin is warm and dry.      Coloration: Skin is not cyanotic, jaundiced, mottled or pale.      Findings: No erythema or rash.   Neurological:      Mental Status: She is alert and oriented to person, place, and time.   Psychiatric:         Mood  and Affect: Mood and affect normal.         LAB RESULTS  Lab Results (last 24 hours)       Procedure Component Value Units Date/Time    CBC & Differential [[id number]]  (Normal) Collected: 06/06/23 1445    Specimen: Blood Updated: 06/06/23 1502    Narrative:      The following orders were created for panel order CBC & Differential.  Procedure                               Abnormality         Status                     ---------                               -----------         ------                     CBC Auto Differential[[id number]]        Normal              Final result                 Please view results for these tests on the individual orders.    Comprehensive Metabolic Panel [[id number]]  (Abnormal) Collected: 06/06/23 1445    Specimen: Blood Updated: 06/06/23 1523     Glucose 96 mg/dL      BUN 12 mg/dL      Creatinine 1.09 mg/dL      Sodium 135 mmol/L      Potassium 3.6 mmol/L      Chloride 98 mmol/L      CO2 26.8 mmol/L      Calcium 9.4 mg/dL      Total Protein 6.9 g/dL      Albumin 4.1 g/dL      ALT (SGPT) 15 U/L      AST (SGOT) 16 U/L      Alkaline Phosphatase 18 U/L      Total Bilirubin 0.2 mg/dL      Globulin 2.8 gm/dL      A/G Ratio 1.5 g/dL      BUN/Creatinine Ratio 11.0     Anion Gap 10.2 mmol/L      eGFR 65.6 mL/min/1.73     Narrative:      GFR Normal >60  Chronic Kidney Disease <60  Kidney Failure <15      Lipase [[id number]]  (Normal) Collected: 06/06/23 1445    Specimen: Blood Updated: 06/06/23 1523     Lipase 17 U/L     CBC Auto Differential [[id number]]  (Normal) Collected: 06/06/23 1445    Specimen: Blood Updated: 06/06/23 1502     WBC 7.85 10*3/mm3      RBC 4.71 10*6/mm3      Hemoglobin 13.8 g/dL      Hematocrit 41.8 %      MCV 88.7 fL      MCH 29.3 pg      MCHC 33.0 g/dL      RDW 13.7 %      RDW-SD 44.6 fl      MPV 9.7 fL      Platelets 302 10*3/mm3      Neutrophil % 49.8 %      Lymphocyte % 38.7 %      Monocyte % 7.5 %      Eosinophil % 2.9 %      Basophil % 0.8 %      Immature Grans % 0.3 %       Neutrophils, Absolute 3.91 10*3/mm3      Lymphocytes, Absolute 3.04 10*3/mm3      Monocytes, Absolute 0.59 10*3/mm3      Eosinophils, Absolute 0.23 10*3/mm3      Basophils, Absolute 0.06 10*3/mm3      Immature Grans, Absolute 0.02 10*3/mm3      nRBC 0.0 /100 WBC     Pregnancy, Urine - Urine, Clean Catch [[id number]]  (Normal) Collected: 06/06/23 1542    Specimen: Urine, Clean Catch Updated: 06/06/23 1601     HCG, Urine QL Negative    Urinalysis With Microscopic If Indicated (No Culture) - Urine, Clean Catch [[id number]]  (Normal) Collected: 06/06/23 1542    Specimen: Urine, Clean Catch Updated: 06/06/23 1557     Color, UA Yellow     Appearance, UA Clear     pH, UA 6.0     Specific Gravity, UA 1.020     Glucose, UA Negative     Ketones, UA Negative     Bilirubin, UA Negative     Blood, UA Negative     Protein, UA Negative     Leuk Esterase, UA Negative     Nitrite, UA Negative     Urobilinogen, UA 0.2 E.U./dL    Narrative:      Urine microscopic not indicated.            I ordered the above labs and reviewed the results    RADIOLOGY  CT Abdomen Pelvis With Contrast    Result Date: 6/6/2023  CT ABDOMEN AND PELVIS WITH CONTRAST, 06/06/2023  HISTORY: 43-year-old female in the ED with 2 day history of right upper quadrant pain. History of cholelithiasis.  TECHNIQUE: CT imaging of the abdomen and pelvis with IV contrast. Radiation dose reduction techniques included automated exposure control or exposure modulation based on body size. Radiation audit for CT and nuclear cardiology exams in the last 12 months: 1.  COMPARISON: *  CT abdomen/pelvis, 12/21/2022.  ABDOMEN FINDINGS: Numerous large gallstones within a contracted gallbladder, similar to the prior study. No intrahepatic or extrahepatic bile duct dilatation.  Liver, pancreas and spleen are normal in size and appearance.  Both kidneys are negative, with the exception of a single tiny benign cysts within each kidney. No visible nephrolithiasis or evidence of upper  urinary tract obstruction or renal inflammation.  Small bowel and colon are normal in caliber and appearance. The appendix is surgically absent. The stomach is nondistended, there is no hiatal hernia or distal esophageal dilatation. Tiny fat-containing umbilical hernia with no complicated features.  PELVIS FINDINGS: Uterus, adnexal regions, urinary bladder and rectum are within normal limits. No free pelvic fluid. No inguinal hernia.  Lung base images show no active disease.      1. No acute abnormality within the abdomen or pelvis. 2. Cholelithiasis. Numerous large gallstones within a nondistended gallbladder. No bile duct dilatation. 3. Appendectomy.  This report was finalized on 6/6/2023 4:57 PM by Dr. Jorge Spence MD.      US Gallbladder    Result Date: 6/6/2023  ULTRASOUND ABDOMEN, LIMITED, 06/06/2023  HISTORY: Right upper quadrant pain. Symptoms 2 days. Worsening symptoms today. Known history of gallstones.  TECHNIQUE: Grayscale ultrasound imaging of the right upper quadrant was performed. Correlation made with abdomen and pelvis CT without contrast 12/21/2022  FINDINGS:  Pancreas unremarkable to the extent visualized. Significant portions of the body and tail are obscured from view and not visualized or assessed. Survey images of the liver demonstrate no focal liver mass or ascites. Main portal vein is patent with appropriate directional flow. Echogenicity of the liver is slightly increased compared to the right kidney and may reflect mild hepatic steatosis. Liver measures 15.7 cm. The right kidney is nonobstructed and measures 11 cm.  Evaluation of the gallbladder demonstrates a wall echo shadow complex supportive of a gallbladder filled with stones. The gallbladder is otherwise not well evaluated with ultrasound. To the extent visualized the gallbladder wall is probably on the order of 3 mm in thickness. Extrahepatic common bile duct measures 4 mm. The ultrasound technologist reports no sonographic  Covington's sign during the examination.      1. Wall echo shadow complex supportive of extensive cholelithiasis. No additional ultrasound evidence of acute cholecystitis. No biliary ductal dilatation. CT may provide additional diagnostic information and is pending and will be dictated separately. 2. Equivocal mild hepatic steatosis. 3. Limited evaluation of pancreas.  This report was finalized on 6/6/2023 4:09 PM by Dr. Jason Church MD.       I ordered the above radiologic testing and reviewed the results    PROCEDURES  Procedures      PROGRESS AND CONSULTS  ED Course as of 06/06/23 1718   Tue Jun 06, 2023   1438 Patient appears well.  Her blood pressure is elevated.  All other vital signs are stable and within normal limits.  She has mild tenderness to palpation in the epigastric region of her abdomen.  Will order labs and imaging to further evaluate. [AH]   1712 Results discussed with the patient.  She expressed understanding.  Follow-up instructions given.  Return to the ED instructions given. [AH]      ED Course User Index  [AH] Hannah Bucio PA-C           MEDICAL DECISION MAKING    MDM       My differential diagnosis for abdominal pain includes but is not limited to:  Gastritis, gastroenteritis, peptic ulcer disease, GERD, esophageal perforation, acute appendicitis, mesenteric adenitis, Meckel’s diverticulum, epiploic appendagitis, diverticulitis, colon cancer, ulcerative colitis, Crohn’s disease, intussusception, small bowel obstruction, adhesions, ischemic bowel, perforated viscus, ileus, obstipation, biliary colic, cholecystitis, cholelithiasis, Rudolph-Vinay Anil, hepatitis, pancreatitis, common bile duct obstruction, cholangitis, bile leak, splenic trauma, splenic rupture, splenic infarction, splenic abscess, abdominal abscess, ascites, spontaneous bacterial peritonitis, hernia, UTI, cystitis,ureterolithiasis, urinary obstruction, ovarian cyst, torsion, pregnancy, ectopic pregnancy, PID, pelvic abscess,  mittelschmerz, endometriosis, AAA, myocardial infarction, pneumonia, cancer, porphyria, DKA, medications, sickle cell, viral syndrome, zoster   DIAGNOSIS  Final diagnoses:   Calculus of gallbladder without cholecystitis without obstruction       Latest Documented Vital Signs:  As of 17:18 EDT  BP- (!) 196/120 HR- 95 Temp- 98.3 °F (36.8 °C) (Oral) O2 sat- 97%    DISPOSITION  Pt discharged    Discussed pertinent findings with the patient/family.  Patient/Family voiced understanding of need to follow-up for recheck and further testing as needed.  Return to the Emergency Department warnings were given.         Medication List        New Prescriptions      dicyclomine 20 MG tablet  Commonly known as: BENTYL  Take 1 tablet by mouth Every 8 (Eight) Hours As Needed for Abdominal Cramping.     ondansetron ODT 4 MG disintegrating tablet  Commonly known as: ZOFRAN-ODT  Place 1 tablet on the tongue Every 8 (Eight) Hours As Needed for Nausea or Vomiting.               Where to Get Your Medications        These medications were sent to [facility] [id number] [place], [place] - [address] - [phone]  - [phone]   [address] [address]      Phone: [phone]   dicyclomine 20 MG tablet  ondansetron ODT 4 MG disintegrating tablet              Follow-up Information       Marli Astorga, GERARDO. Call today.    Specialties: Family Medicine, Internal Medicine, Emergency Medicine  Why: to schedule follow up  Contact information:  [address] [facility] [address]  [facility] [id number] [phone]               Caitlin Yoon DO. Call today.    Specialty: General Surgery  Why: to schedule follow up  Contact information:  [address]  [address]  [facility] [address] [phone]               Go to  [facility] Emergency Department.    Specialty: Emergency Medicine  Why: If symptoms worsen  Contact information:  [address] [facility] [place] [address] [phone]                              Dictated utilizing Mustapha dictation     Hannah Bucio PA-C  06/06/23 [id number]

## 2023-06-06 NOTE — DISCHARGE INSTRUCTIONS
Medications as directed.  Follow-up with your PCP as above.  Call Dr. Yoon regarding a possible cholecystectomy.  Return to the ED for worsening symptoms or medical emergencies.

## 2023-06-06 NOTE — Clinical Note
[place]  [facility] EMERGENCY DEPARTMENT  [address]  [place] [address]  Phone: [phone]    Sofy Martines was seen and treated in our emergency department on 6/6/2023.  She may return to work on 06/08/2023.         Thank you for choosing [facility].    Sandeep Wilson MD

## 2023-09-05 ENCOUNTER — OFFICE VISIT (OUTPATIENT)
Dept: INTERNAL MEDICINE | Facility: CLINIC | Age: 43
End: 2023-09-05
Payer: COMMERCIAL

## 2023-09-05 VITALS
BODY MASS INDEX: 47.59 KG/M2 | WEIGHT: 242.4 LBS | TEMPERATURE: 97.8 F | HEIGHT: 60 IN | OXYGEN SATURATION: 98 % | DIASTOLIC BLOOD PRESSURE: 112 MMHG | HEART RATE: 109 BPM | SYSTOLIC BLOOD PRESSURE: 160 MMHG

## 2023-09-05 DIAGNOSIS — R60.9 DEPENDENT EDEMA: ICD-10-CM

## 2023-09-05 DIAGNOSIS — I10 ESSENTIAL HYPERTENSION: Primary | ICD-10-CM

## 2023-09-05 PROCEDURE — 1159F MED LIST DOCD IN RCRD: CPT | Performed by: NURSE PRACTITIONER

## 2023-09-05 PROCEDURE — 1160F RVW MEDS BY RX/DR IN RCRD: CPT | Performed by: NURSE PRACTITIONER

## 2023-09-05 PROCEDURE — 3077F SYST BP >= 140 MM HG: CPT | Performed by: NURSE PRACTITIONER

## 2023-09-05 PROCEDURE — 99214 OFFICE O/P EST MOD 30 MIN: CPT | Performed by: NURSE PRACTITIONER

## 2023-09-05 PROCEDURE — 3080F DIAST BP >= 90 MM HG: CPT | Performed by: NURSE PRACTITIONER

## 2023-09-05 RX ORDER — AMLODIPINE BESYLATE 5 MG/1
5 TABLET ORAL DAILY
Qty: 30 TABLET | Refills: 1 | Status: SHIPPED | OUTPATIENT
Start: 2023-09-05

## 2023-09-05 RX ORDER — LISINOPRIL AND HYDROCHLOROTHIAZIDE 25; 20 MG/1; MG/1
1 TABLET ORAL DAILY
Qty: 30 TABLET | Refills: 1 | Status: SHIPPED | OUTPATIENT
Start: 2023-09-05

## 2023-09-05 NOTE — PROGRESS NOTES
"Subjective   Sofysa CATERINA Martines is a 43 y.o. female presenting today for follow up of   Chief Complaint   Patient presents with    Leg Swelling     Swelling in both legs, especially left. Pain in ankles.     Hypertension         Outpatient Medications Marked as Taking for the 9/5/23 encounter (Office Visit) with Marli Astorga APRN   Medication Sig Dispense Refill    amLODIPine (NORVASC) 5 MG tablet Take 1 tablet by mouth Daily. 30 tablet 1    lisinopril-hydrochlorothiazide (PRINZIDE,ZESTORETIC) 20-25 MG per tablet Take 1 tablet by mouth Daily. 30 tablet 1       Does not SMBP. No BP meds in at least 5mo. Last PCP visit was more than 2yrs ago.      The following portions of the patient's history were reviewed and updated as appropriate: allergies, current medications, past family history, past medical history, past social history, past surgical history and problem list.    Review of Systems   Eyes:  Negative for visual disturbance.   Respiratory:  Negative for shortness of breath.    Cardiovascular:  Positive for leg swelling. Negative for chest pain and palpitations.   Neurological:  Positive for headache (occas mild). Negative for dizziness.     Objective   Vitals:    09/05/23 1526 09/05/23 1600   BP: (!) 198/112 (!) 160/112   BP Location: Left arm    Patient Position: Sitting    Cuff Size: Large Adult    Pulse: 109    Temp: 97.8 °F (36.6 °C)    TempSrc: Infrared    SpO2: 98%    Weight: 110 kg (242 lb 6.4 oz)    Height: 152.4 cm (60\")        BP Readings from Last 3 Encounters:   09/05/23 (!) 160/112   07/17/23 (!) 190/116   06/06/23 (!) 196/120        Wt Readings from Last 3 Encounters:   09/05/23 110 kg (242 lb 6.4 oz)   07/17/23 95.3 kg (210 lb)   06/06/23 90.7 kg (200 lb)        Body mass index is 47.34 kg/m².  Nursing notes and vitals reviewed.    Physical Exam  Constitutional:       General: She is not in acute distress.     Appearance: She is well-developed.   Cardiovascular:      Rate and Rhythm: " Regular rhythm.      Pulses: Normal pulses.      Heart sounds: S1 normal and S2 normal.   Pulmonary:      Effort: Pulmonary effort is normal.      Breath sounds: Normal breath sounds.   Musculoskeletal:      Right lower le+ Edema (non-pitting) present.      Left lower le+ Edema (non-pitting) present.   Neurological:      Mental Status: She is alert and oriented to person, place, and time.   Psychiatric:         Attention and Perception: She is attentive.         Behavior: Behavior normal.         Thought Content: Thought content normal.       Recent Results (from the past 3360 hour(s))   Comprehensive Metabolic Panel    Collection Time: 23  2:45 PM    Specimen: Blood   Result Value Ref Range    Glucose 96 65 - 99 mg/dL    BUN 12 6 - 20 mg/dL    Creatinine 1.09 (H) 0.57 - 1.00 mg/dL    Sodium 135 (L) 136 - 145 mmol/L    Potassium 3.6 3.5 - 5.2 mmol/L    Chloride 98 98 - 107 mmol/L    CO2 26.8 22.0 - 29.0 mmol/L    Calcium 9.4 8.6 - 10.5 mg/dL    Total Protein 6.9 6.0 - 8.5 g/dL    Albumin 4.1 3.5 - 5.2 g/dL    ALT (SGPT) 15 1 - 33 U/L    AST (SGOT) 16 1 - 32 U/L    Alkaline Phosphatase 18 (L) 39 - 117 U/L    Total Bilirubin 0.2 0.0 - 1.2 mg/dL    Globulin 2.8 gm/dL    A/G Ratio 1.5 g/dL    BUN/Creatinine Ratio 11.0 7.0 - 25.0    Anion Gap 10.2 5.0 - 15.0 mmol/L    eGFR 65.6 >60.0 mL/min/1.73   Lipase    Collection Time: 23  2:45 PM    Specimen: Blood   Result Value Ref Range    Lipase 17 13 - 60 U/L   CBC Auto Differential    Collection Time: 23  2:45 PM    Specimen: Blood   Result Value Ref Range    WBC 7.85 3.40 - 10.80 10*3/mm3    RBC 4.71 3.77 - 5.28 10*6/mm3    Hemoglobin 13.8 12.0 - 15.9 g/dL    Hematocrit 41.8 34.0 - 46.6 %    MCV 88.7 79.0 - 97.0 fL    MCH 29.3 26.6 - 33.0 pg    MCHC 33.0 31.5 - 35.7 g/dL    RDW 13.7 12.3 - 15.4 %    RDW-SD 44.6 37.0 - 54.0 fl    MPV 9.7 6.0 - 12.0 fL    Platelets 302 140 - 450 10*3/mm3    Neutrophil % 49.8 42.7 - 76.0 %    Lymphocyte % 38.7 19.6 -  45.3 %    Monocyte % 7.5 5.0 - 12.0 %    Eosinophil % 2.9 0.3 - 6.2 %    Basophil % 0.8 0.0 - 1.5 %    Immature Grans % 0.3 0.0 - 0.5 %    Neutrophils, Absolute 3.91 1.70 - 7.00 10*3/mm3    Lymphocytes, Absolute 3.04 0.70 - 3.10 10*3/mm3    Monocytes, Absolute 0.59 0.10 - 0.90 10*3/mm3    Eosinophils, Absolute 0.23 0.00 - 0.40 10*3/mm3    Basophils, Absolute 0.06 0.00 - 0.20 10*3/mm3    Immature Grans, Absolute 0.02 0.00 - 0.05 10*3/mm3    nRBC 0.0 0.0 - 0.2 /100 WBC   Pregnancy, Urine - Urine, Clean Catch    Collection Time: 06/06/23  3:42 PM    Specimen: Urine, Clean Catch   Result Value Ref Range    HCG, Urine QL Negative Negative   Urinalysis With Microscopic If Indicated (No Culture) - Urine, Clean Catch    Collection Time: 06/06/23  3:42 PM    Specimen: Urine, Clean Catch   Result Value Ref Range    Color, UA Yellow Yellow, Straw    Appearance, UA Clear Clear    pH, UA 6.0 4.5 - 8.0    Specific Gravity, UA 1.020 1.003 - 1.030    Glucose, UA Negative Negative    Ketones, UA Negative Negative    Bilirubin, UA Negative Negative    Blood, UA Negative Negative    Protein, UA Negative Negative    Leuk Esterase, UA Negative Negative    Nitrite, UA Negative Negative    Urobilinogen, UA 0.2 E.U./dL 0.2 - 1.0 E.U./dL   Comprehensive Metabolic Panel    Collection Time: 07/17/23  8:52 AM    Specimen: Blood   Result Value Ref Range    Glucose 105 (H) 65 - 99 mg/dL    BUN 12 6 - 20 mg/dL    Creatinine 1.05 (H) 0.57 - 1.00 mg/dL    Sodium 138 136 - 145 mmol/L    Potassium 3.3 (L) 3.5 - 5.2 mmol/L    Chloride 102 98 - 107 mmol/L    CO2 24.0 22.0 - 29.0 mmol/L    Calcium 9.2 8.6 - 10.5 mg/dL    Total Protein 7.0 6.0 - 8.5 g/dL    Albumin 3.6 3.5 - 5.2 g/dL    ALT (SGPT) 16 1 - 33 U/L    AST (SGOT) 15 1 - 32 U/L    Alkaline Phosphatase 21 (L) 39 - 117 U/L    Total Bilirubin 0.2 0.0 - 1.2 mg/dL    Globulin 3.4 gm/dL    A/G Ratio 1.1 g/dL    BUN/Creatinine Ratio 11.4 7.0 - 25.0    Anion Gap 12.0 5.0 - 15.0 mmol/L    eGFR 68.6  >60.0 mL/min/1.73   Magnesium    Collection Time: 07/17/23  8:52 AM    Specimen: Blood   Result Value Ref Range    Magnesium 2.1 1.6 - 2.6 mg/dL   Phosphorus    Collection Time: 07/17/23  8:52 AM    Specimen: Blood   Result Value Ref Range    Phosphorus 4.2 2.5 - 4.5 mg/dL   CBC Auto Differential    Collection Time: 07/17/23  8:52 AM    Specimen: Blood   Result Value Ref Range    WBC 10.29 3.40 - 10.80 10*3/mm3    RBC 4.71 3.77 - 5.28 10*6/mm3    Hemoglobin 13.6 12.0 - 15.9 g/dL    Hematocrit 41.6 34.0 - 46.6 %    MCV 88.3 79.0 - 97.0 fL    MCH 28.9 26.6 - 33.0 pg    MCHC 32.7 31.5 - 35.7 g/dL    RDW 13.7 12.3 - 15.4 %    RDW-SD 44.5 37.0 - 54.0 fl    MPV 9.2 6.0 - 12.0 fL    Platelets 298 140 - 450 10*3/mm3    Neutrophil % 64.7 42.7 - 76.0 %    Lymphocyte % 25.2 19.6 - 45.3 %    Monocyte % 7.2 5.0 - 12.0 %    Eosinophil % 2.4 0.3 - 6.2 %    Basophil % 0.4 0.0 - 1.5 %    Immature Grans % 0.1 0.0 - 0.5 %    Neutrophils, Absolute 6.66 1.70 - 7.00 10*3/mm3    Lymphocytes, Absolute 2.59 0.70 - 3.10 10*3/mm3    Monocytes, Absolute 0.74 0.10 - 0.90 10*3/mm3    Eosinophils, Absolute 0.25 0.00 - 0.40 10*3/mm3    Basophils, Absolute 0.04 0.00 - 0.20 10*3/mm3    Immature Grans, Absolute 0.01 0.00 - 0.05 10*3/mm3           Assessment & Plan   Diagnoses and all orders for this visit:    1. Essential hypertension (Primary)  -     amLODIPine (NORVASC) 5 MG tablet; Take 1 tablet by mouth Daily.  Dispense: 30 tablet; Refill: 1  -     lisinopril-hydrochlorothiazide (PRINZIDE,ZESTORETIC) 20-25 MG per tablet; Take 1 tablet by mouth Daily.  Dispense: 30 tablet; Refill: 1  -     CBC (No Diff); Future  -     Comprehensive Metabolic Panel; Future  -     Lipid Panel With / Chol / HDL Ratio; Future  -     TSH; Future  -     Urinalysis without microscopic (no culture) - Urine, Clean Catch; Future    2. Dependent edema  -     lisinopril-hydrochlorothiazide (PRINZIDE,ZESTORETIC) 20-25 MG per tablet; Take 1 tablet by mouth Daily.  Dispense: 30  tablet; Refill: 1      Restart above anti-hypertensives.  Avoid salt, caffeine, ETOH.  Cutback on processed foods.  SMBP QD and bring log to next OV.      Medications, including side effects, were discussed with the patient. Patient verbalized understanding.  The plan of care was discussed. All questions were answered. Patient verbalized understanding.      Return in about 4 weeks (around 10/3/2023) for fasting labs one week prior to, Annual physical.

## 2023-09-13 ENCOUNTER — HOSPITAL ENCOUNTER (EMERGENCY)
Facility: HOSPITAL | Age: 43
Discharge: HOME | End: 2023-09-13
Attending: EMERGENCY MEDICINE
Payer: COMMERCIAL

## 2023-09-13 ENCOUNTER — APPOINTMENT (OUTPATIENT)
Dept: CT IMAGING | Facility: HOSPITAL | Age: 43
End: 2023-09-13
Payer: COMMERCIAL

## 2023-09-13 VITALS
OXYGEN SATURATION: 95 % | DIASTOLIC BLOOD PRESSURE: 88 MMHG | HEIGHT: 61 IN | TEMPERATURE: 98.1 F | WEIGHT: 252.9 LBS | BODY MASS INDEX: 47.75 KG/M2 | SYSTOLIC BLOOD PRESSURE: 143 MMHG | RESPIRATION RATE: 18 BRPM | HEART RATE: 102 BPM

## 2023-09-13 DIAGNOSIS — K52.9 ACUTE GASTROENTERITIS: Primary | ICD-10-CM

## 2023-09-13 LAB
ALBUMIN SERPL-MCNC: 4.3 G/DL (ref 3.5–5.2)
ALBUMIN/GLOB SERPL: 1.3 G/DL
ALP SERPL-CCNC: 22 U/L (ref 39–117)
ALT SERPL W P-5'-P-CCNC: 16 U/L (ref 1–33)
AMPHET+METHAMPHET UR QL: POSITIVE
AMPHETAMINES UR QL: POSITIVE
ANION GAP SERPL CALCULATED.3IONS-SCNC: 13 MMOL/L (ref 5–15)
AST SERPL-CCNC: 16 U/L (ref 1–32)
BACTERIA UR QL AUTO: ABNORMAL /HPF
BARBITURATES UR QL SCN: NEGATIVE
BASOPHILS # BLD AUTO: 0.07 10*3/MM3 (ref 0–0.2)
BASOPHILS NFR BLD AUTO: 0.7 % (ref 0–1.5)
BENZODIAZ UR QL SCN: NEGATIVE
BILIRUB SERPL-MCNC: 0.4 MG/DL (ref 0–1.2)
BILIRUB UR QL STRIP: NEGATIVE
BUN SERPL-MCNC: 23 MG/DL (ref 6–20)
BUN/CREAT SERPL: 19.8 (ref 7–25)
BUPRENORPHINE SERPL-MCNC: NEGATIVE NG/ML
CALCIUM SPEC-SCNC: 9.3 MG/DL (ref 8.6–10.5)
CANNABINOIDS SERPL QL: NEGATIVE
CHLORIDE SERPL-SCNC: 94 MMOL/L (ref 98–107)
CLARITY UR: ABNORMAL
CO2 SERPL-SCNC: 31 MMOL/L (ref 22–29)
COCAINE UR QL: NEGATIVE
COLOR UR: YELLOW
CREAT SERPL-MCNC: 1.16 MG/DL (ref 0.57–1)
DEPRECATED RDW RBC AUTO: 46 FL (ref 37–54)
EGFRCR SERPLBLD CKD-EPI 2021: 60.9 ML/MIN/1.73
EOSINOPHIL # BLD AUTO: 0.28 10*3/MM3 (ref 0–0.4)
EOSINOPHIL NFR BLD AUTO: 2.8 % (ref 0.3–6.2)
ERYTHROCYTE [DISTWIDTH] IN BLOOD BY AUTOMATED COUNT: 14.1 % (ref 12.3–15.4)
GLOBULIN UR ELPH-MCNC: 3.4 GM/DL
GLUCOSE SERPL-MCNC: 130 MG/DL (ref 65–99)
GLUCOSE UR STRIP-MCNC: NEGATIVE MG/DL
HCG SERPL QL: NEGATIVE
HCT VFR BLD AUTO: 43.2 % (ref 34–46.6)
HGB BLD-MCNC: 14.4 G/DL (ref 12–15.9)
HGB UR QL STRIP.AUTO: ABNORMAL
HYALINE CASTS UR QL AUTO: ABNORMAL /LPF
IMM GRANULOCYTES # BLD AUTO: 0.04 10*3/MM3 (ref 0–0.05)
IMM GRANULOCYTES NFR BLD AUTO: 0.4 % (ref 0–0.5)
KETONES UR QL STRIP: NEGATIVE
LEUKOCYTE ESTERASE UR QL STRIP.AUTO: NEGATIVE
LIPASE SERPL-CCNC: 18 U/L (ref 13–60)
LYMPHOCYTES # BLD AUTO: 3.28 10*3/MM3 (ref 0.7–3.1)
LYMPHOCYTES NFR BLD AUTO: 33 % (ref 19.6–45.3)
MCH RBC QN AUTO: 29.7 PG (ref 26.6–33)
MCHC RBC AUTO-ENTMCNC: 33.3 G/DL (ref 31.5–35.7)
MCV RBC AUTO: 89.1 FL (ref 79–97)
METHADONE UR QL SCN: NEGATIVE
MONOCYTES # BLD AUTO: 0.88 10*3/MM3 (ref 0.1–0.9)
MONOCYTES NFR BLD AUTO: 8.9 % (ref 5–12)
NEUTROPHILS NFR BLD AUTO: 5.38 10*3/MM3 (ref 1.7–7)
NEUTROPHILS NFR BLD AUTO: 54.2 % (ref 42.7–76)
NITRITE UR QL STRIP: NEGATIVE
NRBC BLD AUTO-RTO: 0 /100 WBC (ref 0–0.2)
OPIATES UR QL: NEGATIVE
OXYCODONE UR QL SCN: NEGATIVE
PCP UR QL SCN: NEGATIVE
PH UR STRIP.AUTO: 8.5 [PH] (ref 4.5–8)
PLATELET # BLD AUTO: 362 10*3/MM3 (ref 140–450)
PMV BLD AUTO: 9.6 FL (ref 6–12)
POTASSIUM SERPL-SCNC: 3.6 MMOL/L (ref 3.5–5.2)
PROPOXYPH UR QL: NEGATIVE
PROT SERPL-MCNC: 7.7 G/DL (ref 6–8.5)
PROT UR QL STRIP: ABNORMAL
RBC # BLD AUTO: 4.85 10*6/MM3 (ref 3.77–5.28)
RBC # UR STRIP: ABNORMAL /HPF
REF LAB TEST METHOD: ABNORMAL
SODIUM SERPL-SCNC: 138 MMOL/L (ref 136–145)
SP GR UR STRIP: 1.01 (ref 1–1.03)
SQUAMOUS #/AREA URNS HPF: ABNORMAL /HPF
TRICYCLICS UR QL SCN: NEGATIVE
UNIDENT CRYS URNS QL MICRO: ABNORMAL /HPF
UROBILINOGEN UR QL STRIP: ABNORMAL
WBC # UR STRIP: ABNORMAL /HPF
WBC NRBC COR # BLD: 9.93 10*3/MM3 (ref 3.4–10.8)

## 2023-09-13 PROCEDURE — 25510000001 IOPAMIDOL PER 1 ML: Performed by: EMERGENCY MEDICINE

## 2023-09-13 PROCEDURE — 81001 URINALYSIS AUTO W/SCOPE: CPT | Performed by: EMERGENCY MEDICINE

## 2023-09-13 PROCEDURE — 74177 CT ABD & PELVIS W/CONTRAST: CPT

## 2023-09-13 PROCEDURE — 83690 ASSAY OF LIPASE: CPT | Performed by: EMERGENCY MEDICINE

## 2023-09-13 PROCEDURE — 96374 THER/PROPH/DIAG INJ IV PUSH: CPT

## 2023-09-13 PROCEDURE — 99285 EMERGENCY DEPT VISIT HI MDM: CPT

## 2023-09-13 PROCEDURE — 80053 COMPREHEN METABOLIC PANEL: CPT | Performed by: EMERGENCY MEDICINE

## 2023-09-13 PROCEDURE — 96375 TX/PRO/DX INJ NEW DRUG ADDON: CPT

## 2023-09-13 PROCEDURE — 25010000002 ONDANSETRON PER 1 MG: Performed by: EMERGENCY MEDICINE

## 2023-09-13 PROCEDURE — 80306 DRUG TEST PRSMV INSTRMNT: CPT | Performed by: EMERGENCY MEDICINE

## 2023-09-13 PROCEDURE — 85025 COMPLETE CBC W/AUTO DIFF WBC: CPT | Performed by: EMERGENCY MEDICINE

## 2023-09-13 PROCEDURE — 84703 CHORIONIC GONADOTROPIN ASSAY: CPT | Performed by: EMERGENCY MEDICINE

## 2023-09-13 RX ORDER — FAMOTIDINE 10 MG/ML
20 INJECTION, SOLUTION INTRAVENOUS ONCE
Status: COMPLETED | OUTPATIENT
Start: 2023-09-13 | End: 2023-09-13

## 2023-09-13 RX ORDER — ONDANSETRON 2 MG/ML
4 INJECTION INTRAMUSCULAR; INTRAVENOUS ONCE
Status: COMPLETED | OUTPATIENT
Start: 2023-09-13 | End: 2023-09-13

## 2023-09-13 RX ORDER — FAMOTIDINE 20 MG/1
20 TABLET, FILM COATED ORAL 2 TIMES DAILY
Qty: 30 TABLET | Refills: 0 | Status: SHIPPED | OUTPATIENT
Start: 2023-09-13

## 2023-09-13 RX ORDER — ONDANSETRON HYDROCHLORIDE 8 MG/1
8 TABLET, FILM COATED ORAL EVERY 8 HOURS PRN
Qty: 10 TABLET | Refills: 0 | Status: SHIPPED | OUTPATIENT
Start: 2023-09-13

## 2023-09-13 RX ADMIN — ONDANSETRON 4 MG: 2 INJECTION INTRAMUSCULAR; INTRAVENOUS at 19:37

## 2023-09-13 RX ADMIN — FAMOTIDINE 20 MG: 10 INJECTION, SOLUTION INTRAVENOUS at 19:37

## 2023-09-13 RX ADMIN — IOPAMIDOL 100 ML: 755 INJECTION, SOLUTION INTRAVENOUS at 20:41

## 2023-09-13 NOTE — ED PROVIDER NOTES
Subjective   History of Present Illness  History of Present Illness    Chief complaint: Vomiting, diarrhea, and abdominal pain    Location: Upper    Quality/Severity: Bright red blood in the emesis today.    Timing/Onset/Duration: Patient started feeling bad last night    Modifying Factors: Worse with food and drink    Associated Symptoms: No headache.  No chills.  Patient had subjective fever.  No cough sore throat earache or nasal congestion.  No chest pain or shortness of breath.  No vaginal bleeding or discharges.  Patient has had nonbloody diarrhea.    Narrative: This 43-year-old white female presents with nausea and vomiting since last night.  Patient's not able to keep solids or liquids down.  The patient states that today's emesis looked like it had blood in it.  Patient complains of abdominal pain that started last night.  It is a constant pain right above her bellybutton.  Patient felt feverish this morning.  The patient has not traveled out of the country.  She has not consumed any untreated water.  She has not been on antibiotics recently.  She has no history of C. difficile.  She has not been around anyone who has been sick.      PCP:Marli Astorga APRN   Review of Systems   Constitutional:  Positive for fever (Subjective). Negative for chills.   HENT:  Negative for congestion, ear pain and sore throat.    Respiratory:  Negative for cough and shortness of breath.    Cardiovascular:  Negative for chest pain.   Gastrointestinal:  Positive for abdominal pain, diarrhea, nausea and vomiting.   Genitourinary:  Negative for difficulty urinating.   Musculoskeletal:  Negative for back pain.   Skin:  Negative for rash.   Neurological:  Negative for headaches.       Past Medical History:   Diagnosis Date    Ankle pain, left     torn ligaments    GERD (gastroesophageal reflux disease)     Headache     Hypertension        No Known Allergies    Past Surgical History:   Procedure Laterality Date     APPENDECTOMY  1999    DILATATION AND CURETTAGE  2004    OOPHORECTOMY  2004    TUBAL ABDOMINAL LIGATION  2005       Family History   Problem Relation Age of Onset    No Known Problems Mother     Other Father         Health History unknown       Social History     Socioeconomic History    Marital status: Single   Tobacco Use    Smoking status: Former     Packs/day: 0.50     Types: Cigarettes    Smokeless tobacco: Never   Vaping Use    Vaping Use: Never used   Substance and Sexual Activity    Alcohol use: No    Drug use: No    Sexual activity: Defer           Objective   Physical Exam  Vitals (The temperature is 98.1 °F, pulse 115, respirations 18, /84, room air pulse ox 99%.) and nursing note reviewed.   Constitutional:       Appearance: She is well-developed. She is obese.   HENT:      Head: Normocephalic and atraumatic.      Mouth/Throat:      Mouth: Mucous membranes are moist.   Cardiovascular:      Rate and Rhythm: Normal rate. Tachycardia present.   Pulmonary:      Effort: Pulmonary effort is normal.      Breath sounds: Normal breath sounds.   Abdominal:      General: Abdomen is flat. Bowel sounds are normal.      Palpations: Abdomen is soft.      Tenderness: There is abdominal tenderness (Upper abdominal pain).      Hernia: No hernia is present.   Skin:     General: Skin is warm and dry.   Neurological:      General: No focal deficit present.      Mental Status: She is alert and oriented to person, place, and time.       Procedures           ED Course  ED Course as of 09/13/23 2240   Wed Sep 13, 2023   2024 The lipase is 18 and normal.    The serum hCG is negative.    The CBC is unremarkable.    The glucose is 130, BUN 23, creatinine 1.16, chloride 94, CO2 31, alk phos 22, CMP is otherwise unremarkable. [RC]   2100 The urine drug screen is positive for methamphetamine and amphetamine the urine drug screen is otherwise unremarkable. [RC]   2100 The urine microscopic shows 0-2 RBCs, 0-2 WBCs, trace bacteria.   There is trace blood, trace protein, leukocyte negative, nitrite negative.  This urinalysis is otherwise unremarkable. [RC]   2100 The CBC is unremarkable. [RC]      ED Course User Index  [RC] Isaiah Doan MD      22:40 EDT, 09/13/23:  The patient was reassessed.  She feels better.  She tolerated clear liquids.  Her vital signs were reviewed and are stable.  Abdominal exam: Soft, mild upper abdominal tenderness, no rebound, no guarding, no masses, positive bowel sounds.    22:41 EDT, 09/13/23: The patient's diagnosis of acute gastroenteritis was discussed with her.  The patient should drink clear liquids for 24 to 48 hours, then advance diet as tolerated.  The patient should take Zofran as needed as directed for nausea and vomiting.  The patient should follow-up with Marli Astorga in 2 days.  The patient should avoid spicy and fatty foods for a week.  The patient should return to the emergency department there is vomiting not controlled with Zofran, vomiting blood, bloody diarrhea, worsening pain, worse in any way at all.  All the patient's questions were answered she will be discharged in good condition                                       Medical Decision Making  Amount and/or Complexity of Data Reviewed  Labs: ordered.  Radiology: ordered.    Risk  Prescription drug management.        Final diagnoses:   Acute gastroenteritis       ED Disposition  ED Disposition       None            No follow-up provider specified.       Medication List      No changes were made to your prescriptions during this visit.            Isaiah Doan MD  09/13/23 1596

## 2023-09-13 NOTE — Clinical Note
AMIE OLIVER  [facility] EMERGENCY DEPARTMENT  [address] [place] [address]  Phone: [phone]    Sofy Martines was seen and treated in our emergency department on 9/13/2023.  She may return to work on 09/16/2023.         Thank you for choosing [facility].    Isaiah Doan MD

## 2023-09-14 NOTE — DISCHARGE INSTRUCTIONS
Clear liquids for 24 to 48 hours, then advance diet as tolerated.  Avoid spicy and fatty foods for 1 week.  Take the Zofran as needed as prescribed for nausea and vomiting.  Follow-up with Marli Astorga in 2 days.  Return to the emergency department if there is vomiting not controlled with Zofran, vomiting blood, bloody diarrhea, worsening pain, worse in any way at all.

## 2023-09-20 DIAGNOSIS — I10 ESSENTIAL HYPERTENSION: ICD-10-CM

## 2023-10-31 DIAGNOSIS — R60.9 DEPENDENT EDEMA: ICD-10-CM

## 2023-10-31 DIAGNOSIS — I10 ESSENTIAL HYPERTENSION: ICD-10-CM

## 2023-10-31 RX ORDER — LISINOPRIL AND HYDROCHLOROTHIAZIDE 25; 20 MG/1; MG/1
1 TABLET ORAL DAILY
Qty: 60 TABLET | Refills: 0 | Status: SHIPPED | OUTPATIENT
Start: 2023-10-31

## 2023-10-31 NOTE — TELEPHONE ENCOUNTER
Rx Refill Note  Requested Prescriptions     Pending Prescriptions Disp Refills    lisinopril-hydrochlorothiazide (PRINZIDE,ZESTORETIC) 20-25 MG per tablet [Pharmacy Med Name: LISINOPRIL-HCTZ 20-25 MG TAB] 60 tablet      Sig: TAKE 1 TABLET BY MOUTH DAILY      Last office visit with prescribing clinician: 9/5/2023   Last telemedicine visit with prescribing clinician: Visit date not found   Next office visit with prescribing clinician: Visit date not found                         Would you like a call back once the refill request has been completed: [] Yes [] No    If the office needs to give you a call back, can they leave a voicemail: [] Yes [] No    Ragini Gonzalez CMA  10/31/23, 08:25 EDT

## 2023-11-14 DIAGNOSIS — I10 ESSENTIAL HYPERTENSION: ICD-10-CM

## 2023-11-14 RX ORDER — AMLODIPINE BESYLATE 5 MG/1
5 TABLET ORAL DAILY
Qty: 30 TABLET | Refills: 1 | Status: SHIPPED | OUTPATIENT
Start: 2023-11-14

## 2023-12-24 DIAGNOSIS — R60.9 DEPENDENT EDEMA: ICD-10-CM

## 2023-12-24 DIAGNOSIS — I10 ESSENTIAL HYPERTENSION: ICD-10-CM

## 2023-12-26 RX ORDER — LISINOPRIL AND HYDROCHLOROTHIAZIDE 25; 20 MG/1; MG/1
1 TABLET ORAL DAILY
Qty: 60 TABLET | Refills: 0 | Status: SHIPPED | OUTPATIENT
Start: 2023-12-26

## 2024-01-27 ENCOUNTER — HOSPITAL ENCOUNTER (EMERGENCY)
Facility: HOSPITAL | Age: 44
Discharge: HOME | End: 2024-01-27
Attending: EMERGENCY MEDICINE
Payer: COMMERCIAL

## 2024-01-27 ENCOUNTER — APPOINTMENT (OUTPATIENT)
Dept: GENERAL RADIOLOGY | Facility: HOSPITAL | Age: 44
End: 2024-01-27
Payer: COMMERCIAL

## 2024-01-27 VITALS
WEIGHT: 160 LBS | BODY MASS INDEX: 31.41 KG/M2 | RESPIRATION RATE: 16 BRPM | OXYGEN SATURATION: 96 % | TEMPERATURE: 98.1 F | DIASTOLIC BLOOD PRESSURE: 74 MMHG | HEIGHT: 60 IN | SYSTOLIC BLOOD PRESSURE: 126 MMHG | HEART RATE: 95 BPM

## 2024-01-27 DIAGNOSIS — R79.89 ELEVATED SERUM CREATININE: ICD-10-CM

## 2024-01-27 DIAGNOSIS — K21.9 GASTROESOPHAGEAL REFLUX DISEASE, UNSPECIFIED WHETHER ESOPHAGITIS PRESENT: Primary | ICD-10-CM

## 2024-01-27 DIAGNOSIS — Z72.0 TOBACCO USE: ICD-10-CM

## 2024-01-27 DIAGNOSIS — F15.10 METHAMPHETAMINE USE: ICD-10-CM

## 2024-01-27 LAB
ALBUMIN SERPL-MCNC: 4.1 G/DL (ref 3.5–5.2)
ALBUMIN/GLOB SERPL: 1.6 G/DL
ALP SERPL-CCNC: 19 U/L (ref 39–117)
ALT SERPL W P-5'-P-CCNC: 17 U/L (ref 1–33)
ANION GAP SERPL CALCULATED.3IONS-SCNC: 8.8 MMOL/L (ref 5–15)
AST SERPL-CCNC: 16 U/L (ref 1–32)
BASOPHILS # BLD AUTO: 0.05 10*3/MM3 (ref 0–0.2)
BASOPHILS NFR BLD AUTO: 0.5 % (ref 0–1.5)
BILIRUB SERPL-MCNC: 0.3 MG/DL (ref 0–1.2)
BUN SERPL-MCNC: 19 MG/DL (ref 6–20)
BUN/CREAT SERPL: 12.2 (ref 7–25)
CALCIUM SPEC-SCNC: 9.7 MG/DL (ref 8.6–10.5)
CHLORIDE SERPL-SCNC: 100 MMOL/L (ref 98–107)
CO2 SERPL-SCNC: 28.2 MMOL/L (ref 22–29)
CREAT SERPL-MCNC: 1.56 MG/DL (ref 0.57–1)
D DIMER PPP FEU-MCNC: <0.27 MCGFEU/ML (ref 0–0.5)
DEPRECATED RDW RBC AUTO: 44.7 FL (ref 37–54)
EGFRCR SERPLBLD CKD-EPI 2021: 42.7 ML/MIN/1.73
EOSINOPHIL # BLD AUTO: 0.27 10*3/MM3 (ref 0–0.4)
EOSINOPHIL NFR BLD AUTO: 2.9 % (ref 0.3–6.2)
ERYTHROCYTE [DISTWIDTH] IN BLOOD BY AUTOMATED COUNT: 13.2 % (ref 12.3–15.4)
GEN 5 2HR TROPONIN T REFLEX: <6 NG/L
GLOBULIN UR ELPH-MCNC: 2.6 GM/DL
GLUCOSE SERPL-MCNC: 98 MG/DL (ref 65–99)
HCT VFR BLD AUTO: 43.1 % (ref 34–46.6)
HGB BLD-MCNC: 14.6 G/DL (ref 12–15.9)
HOLD SPECIMEN: NORMAL
HOLD SPECIMEN: NORMAL
IMM GRANULOCYTES # BLD AUTO: 0.03 10*3/MM3 (ref 0–0.05)
IMM GRANULOCYTES NFR BLD AUTO: 0.3 % (ref 0–0.5)
LYMPHOCYTES # BLD AUTO: 2.83 10*3/MM3 (ref 0.7–3.1)
LYMPHOCYTES NFR BLD AUTO: 30.7 % (ref 19.6–45.3)
MCH RBC QN AUTO: 31.3 PG (ref 26.6–33)
MCHC RBC AUTO-ENTMCNC: 33.9 G/DL (ref 31.5–35.7)
MCV RBC AUTO: 92.3 FL (ref 79–97)
MONOCYTES # BLD AUTO: 0.67 10*3/MM3 (ref 0.1–0.9)
MONOCYTES NFR BLD AUTO: 7.3 % (ref 5–12)
NEUTROPHILS NFR BLD AUTO: 5.36 10*3/MM3 (ref 1.7–7)
NEUTROPHILS NFR BLD AUTO: 58.3 % (ref 42.7–76)
NRBC BLD AUTO-RTO: 0 /100 WBC (ref 0–0.2)
PLATELET # BLD AUTO: 331 10*3/MM3 (ref 140–450)
PMV BLD AUTO: 10 FL (ref 6–12)
POTASSIUM SERPL-SCNC: 3.9 MMOL/L (ref 3.5–5.2)
PROT SERPL-MCNC: 6.7 G/DL (ref 6–8.5)
QT INTERVAL: 346 MS
QT INTERVAL: 406 MS
QTC INTERVAL: 446 MS
QTC INTERVAL: 450 MS
RBC # BLD AUTO: 4.67 10*6/MM3 (ref 3.77–5.28)
SODIUM SERPL-SCNC: 137 MMOL/L (ref 136–145)
TROPONIN T DELTA: NORMAL
TROPONIN T SERPL HS-MCNC: <6 NG/L
WBC NRBC COR # BLD AUTO: 9.21 10*3/MM3 (ref 3.4–10.8)
WHOLE BLOOD HOLD COAG: NORMAL
WHOLE BLOOD HOLD SPECIMEN: NORMAL

## 2024-01-27 PROCEDURE — 93005 ELECTROCARDIOGRAM TRACING: CPT

## 2024-01-27 PROCEDURE — 93010 ELECTROCARDIOGRAM REPORT: CPT | Performed by: INTERNAL MEDICINE

## 2024-01-27 PROCEDURE — 85025 COMPLETE CBC W/AUTO DIFF WBC: CPT

## 2024-01-27 PROCEDURE — 80053 COMPREHEN METABOLIC PANEL: CPT

## 2024-01-27 PROCEDURE — 99284 EMERGENCY DEPT VISIT MOD MDM: CPT

## 2024-01-27 PROCEDURE — 93005 ELECTROCARDIOGRAM TRACING: CPT | Performed by: EMERGENCY MEDICINE

## 2024-01-27 PROCEDURE — 36415 COLL VENOUS BLD VENIPUNCTURE: CPT

## 2024-01-27 PROCEDURE — 85379 FIBRIN DEGRADATION QUANT: CPT | Performed by: EMERGENCY MEDICINE

## 2024-01-27 PROCEDURE — 71046 X-RAY EXAM CHEST 2 VIEWS: CPT

## 2024-01-27 PROCEDURE — 84484 ASSAY OF TROPONIN QUANT: CPT

## 2024-01-27 RX ORDER — FAMOTIDINE 40 MG/1
40 TABLET, FILM COATED ORAL DAILY
Qty: 14 TABLET | Refills: 0 | Status: SHIPPED | OUTPATIENT
Start: 2024-01-27 | End: 2024-02-10

## 2024-01-27 RX ORDER — SODIUM CHLORIDE 0.9 % (FLUSH) 0.9 %
10 SYRINGE (ML) INJECTION AS NEEDED
Status: DISCONTINUED | OUTPATIENT
Start: 2024-01-27 | End: 2024-01-27 | Stop reason: HOSPADM

## 2024-01-27 RX ORDER — ALUMINA, MAGNESIA, AND SIMETHICONE 2400; 2400; 240 MG/30ML; MG/30ML; MG/30ML
15 SUSPENSION ORAL ONCE
Status: COMPLETED | OUTPATIENT
Start: 2024-01-27 | End: 2024-01-27

## 2024-01-27 RX ORDER — ASPIRIN 325 MG
325 TABLET ORAL ONCE
Status: COMPLETED | OUTPATIENT
Start: 2024-01-27 | End: 2024-01-27

## 2024-01-27 RX ORDER — NITROGLYCERIN 0.4 MG/1
0.4 TABLET SUBLINGUAL
Status: DISCONTINUED | OUTPATIENT
Start: 2024-01-27 | End: 2024-01-27 | Stop reason: HOSPADM

## 2024-01-27 RX ORDER — LIDOCAINE HYDROCHLORIDE 20 MG/ML
10 SOLUTION OROPHARYNGEAL ONCE
Status: COMPLETED | OUTPATIENT
Start: 2024-01-27 | End: 2024-01-27

## 2024-01-27 RX ADMIN — LIDOCAINE HYDROCHLORIDE 10 ML: 20 SOLUTION ORAL at 03:19

## 2024-01-27 RX ADMIN — ALUMINUM HYDROXIDE, MAGNESIUM HYDROXIDE, AND DIMETHICONE 15 ML: 400; 400; 40 SUSPENSION ORAL at 03:18

## 2024-01-27 RX ADMIN — NITROGLYCERIN 0.4 MG: 0.4 TABLET SUBLINGUAL at 02:12

## 2024-01-27 RX ADMIN — ASPIRIN 325 MG: 325 TABLET ORAL at 02:12

## 2024-01-27 NOTE — ED PROVIDER NOTES
Subjective   History of Present Illness  Sofy Dalton is a 43-year-old white female who presents secondary to substernal chest pain x 3 hours.  The patient describes the pain as a heaviness.  It radiates to the left arm.  No diaphoresis.  No shortness of breath.  No nausea or vomiting.  Patient rates the pain at a 6.  No aggravating or alleviating factors noted.  Patient did not take any medication to help her relieve the pain.  Patient smoked crystal meth 1 to 2 hours before onset of chest pain.  Patient presents for evaluation.    Cardiac risk factors include hypertension, tobacco abuse, methamphetamine abuse and obesity.    History provided by:  Patient      Review of Systems   Constitutional: Negative.  Negative for fever.   HENT: Negative.  Negative for rhinorrhea.    Eyes: Negative.  Negative for redness.   Respiratory:  Negative for cough.    Cardiovascular:  Positive for chest pain.   Gastrointestinal:  Negative for abdominal pain.   Endocrine: Negative.    Genitourinary: Negative.  Negative for difficulty urinating.   Musculoskeletal: Negative.  Negative for back pain.   Skin: Negative.  Negative for color change.   Neurological: Negative.  Negative for syncope.   Hematological: Negative.    Psychiatric/Behavioral: Negative.     All other systems reviewed and are negative.      Past Medical History:   Diagnosis Date    Ankle pain, left     torn ligaments    GERD (gastroesophageal reflux disease)     Headache     Hypertension        No Known Allergies    Past Surgical History:   Procedure Laterality Date    APPENDECTOMY  1999    DILATATION AND CURETTAGE  2004    OOPHORECTOMY  2004    TUBAL ABDOMINAL LIGATION  2005       Family History   Problem Relation Age of Onset    No Known Problems Mother     Other Father         Health History unknown       Social History     Socioeconomic History    Marital status: Single   Tobacco Use    Smoking status: Every Day     Packs/day: .5     Types: Cigarettes     Smokeless tobacco: Never   Vaping Use    Vaping Use: Never used   Substance and Sexual Activity    Alcohol use: No    Drug use: No    Sexual activity: Defer           Objective   Physical Exam  Vitals and nursing note reviewed.   Constitutional:       General: She is not in acute distress.     Appearance: Normal appearance. She is well-developed. She is not ill-appearing, toxic-appearing or diaphoretic.      Comments: 43-year-old white female lying in bed.  Patient is obese.  She otherwise appears in good overall health.  Patient is friendly and cooperative.  She reports ongoing chest pain.   HENT:      Head: Normocephalic and atraumatic.      Right Ear: Tympanic membrane, ear canal and external ear normal.      Left Ear: Tympanic membrane, ear canal and external ear normal.      Nose: Nose normal.      Mouth/Throat:      Mouth: Mucous membranes are moist.      Pharynx: Oropharynx is clear.   Eyes:      Extraocular Movements: Extraocular movements intact.      Conjunctiva/sclera: Conjunctivae normal.      Pupils: Pupils are equal, round, and reactive to light.   Cardiovascular:      Rate and Rhythm: Normal rate and regular rhythm.      Pulses: Normal pulses.      Heart sounds: Normal heart sounds. No murmur heard.     No friction rub. No gallop.   Pulmonary:      Effort: Pulmonary effort is normal.      Breath sounds: Normal breath sounds. No decreased breath sounds, wheezing, rhonchi or rales.   Abdominal:      General: Bowel sounds are normal. There is no distension.      Palpations: Abdomen is soft. There is no mass.      Tenderness: There is no abdominal tenderness. There is no guarding or rebound.      Hernia: No hernia is present.   Musculoskeletal:         General: Normal range of motion.      Cervical back: Normal range of motion and neck supple.   Skin:     General: Skin is warm and dry.      Capillary Refill: Capillary refill takes less than 2 seconds.   Neurological:      General: No focal deficit  present.      Mental Status: She is alert and oriented to person, place, and time.      Deep Tendon Reflexes: Reflexes are normal and symmetric.   Psychiatric:         Mood and Affect: Mood normal.         Behavior: Behavior normal.         Procedures       EKG 12-lead  Date 1/27/2024  Time 01: 58  Sinus tachycardia with rate 101 beats a minute  Normal axis  Normal intervals  P wave inversion in V1 and V2 consistent with left atrial enlargement.  Normal R wave progression.  No ST elevation or depression  No significant T wave abnormalities  Nonspecific EKG    EKG #2  Date 1/27/2024  Time 04: 38  Normal sinus rhythm  Normal rate  Normal axis  P wave inversion suggesting the left atrial enlargement  No ST elevation or depression  Normal R wave progression  No significant T wave abnormalities  Nonspecific EKG    Unchanged from EKG earlier tonight.    ED Course  ED Course as of 01/27/24 0514   Sat Jan 27, 2024   0205 EKG shows minimal tachycardia with rate 101 as well as left atrial enlargement.  No sign of infarct or ischemia.  Given aspirin and sublingual nitroglycerin.  Obtaining chest x-ray and full set of labs. [SS]   0300 Creatinine(!): 1.56 [SS]   0300 CBC unremarkable.  CMP notable for creatinine of 1.56.  Otherwise unremarkable. [SS]   0302 Initial troponin is negative. [SS]   0310 Patient reports minimal improvement of chest pain with nitroglycerin.  Will try a GI cocktail. [SS]   0316 D-dimer is negative. [SS]   0321 Chest x-ray is unremarkable. [SS]   0343 Patient reports she is feeling better after GI cocktail.  Will obtain delta EKG and troponin 2 hours after first set. [SS]   0508 Repeat EKG is unchanged.  Repeat troponin again negative.  No sign of ACS.  I feel patient's symptoms secondary to reflux.  Will prescribe antacids for home.  Discussed at length with patient all results, diagnoses, treatment and follow-up.  Will DC home.    Prescription  1-Pepcid [SS]      ED Course User Index  [SS] Nikolay  "Alli GUPTA MD      Labs Reviewed   COMPREHENSIVE METABOLIC PANEL - Abnormal; Notable for the following components:       Result Value    Creatinine 1.56 (*)     Alkaline Phosphatase 19 (*)     eGFR 42.7 (*)     All other components within normal limits    Narrative:     GFR Normal >60  Chronic Kidney Disease <60  Kidney Failure <15     TROPONIN - Normal    Narrative:     High Sensitive Troponin T Reference Range:  <14.0 ng/L- Negative Female for AMI  <22.0 ng/L- Negative Male for AMI  >=14 - Abnormal Female indicating possible myocardial injury.  >=22 - Abnormal Male indicating possible myocardial injury.   Clinicians would have to utilize clinical acumen, EKG, Troponin, and serial changes to determine if it is an Acute Myocardial Infarction or myocardial injury due to an underlying chronic condition.        CBC WITH AUTO DIFFERENTIAL - Normal   D-DIMER, QUANTITATIVE - Normal    Narrative:     According to the assay 's published package insert, a normal (<0.50 MCGFEU/mL) D-dimer result in conjunction with a non-high clinical probability assessment, excludes deep vein thrombosis (DVT) and pulmonary embolism (PE) with high sensitivity.    D-dimer values increase with age and this can make VTE exclusion of an older population difficult. To address this, the American College [employer], based on best available evidence and recent guidelines, recommends that clinicians use age-adjusted D-dimer thresholds in patients greater than 50 years of age with: a) a low probability of PE who do not meet all Pulmonary Embolism Rule Out Criteria, or b) in those with intermediate probability of PE.   The formula for an age-adjusted D-dimer cut-off is \"age/100\".  For example, a 60 year old patient would have an age-adjusted cut-off of 0.60 MCGFEU/mL and an 80 year old 0.80 MCGFEU/mL.   RAINBOW DRAW    Narrative:     The following orders were created for panel order [place] Draw.  Procedure                               " Abnormality         Status                     ---------                               -----------         ------                     Green Top (Gel)[[id number]]                                  Final result               Lavender Top[[id number]]                                     Final result               Gold Top - SST[[id number]]                                   Final result               Light Blue Top[[id number]]                                   Final result                 Please view results for these tests on the individual orders.   HIGH SENSITIVITIY TROPONIN T 2HR    Narrative:     High Sensitive Troponin T Reference Range:  <14.0 ng/L- Negative Female for AMI  <22.0 ng/L- Negative Male for AMI  >=14 - Abnormal Female indicating possible myocardial injury.  >=22 - Abnormal Male indicating possible myocardial injury.   Clinicians would have to utilize clinical acumen, EKG, Troponin, and serial changes to determine if it is an Acute Myocardial Infarction or myocardial injury due to an underlying chronic condition.        CBC AND DIFFERENTIAL    Narrative:     The following orders were created for panel order CBC & Differential.  Procedure                               Abnormality         Status                     ---------                               -----------         ------                     CBC Auto Differential[[id number]]        Normal              Final result                 Please view results for these tests on the individual orders.   GREEN TOP   LAVENDER TOP   GOLD TOP - SST   LIGHT BLUE TOP       XR Chest 2 View    Result Date: 1/27/2024  Narrative: CR Chest 2 Vws INDICATION: Chest pain COMPARISON: Chest 5/17/2018 FINDINGS: PA and lateral views of the chest. The lungs are clear. No pleural effusions. No pneumothorax. Heart size and mediastinal contours are within normal limits. Pulmonary vasculature is normal. Osseous structures are unremarkable.     Impression: No acute cardiopulmonary  findings. Signer Name: Conrad Randall MD Signed: 1/27/2024 3:17 AM EST [facility]     My differential diagnosis for chest pain includes but is not limited to:  Muscle strain, costochondritis, myositis, pleurisy, rib fracture, intercostal neuritis, herpes zoster, tumor, myocardial infarction, coronary syndrome, unstable angina, angina, aortic dissection, mitral valve prolapse, pericarditis, palpitations, pulmonary embolus, pneumonia, pneumothorax, lung cancer, GERD, esophagitis, esophageal spasm                                         Medical Decision Making  Problems Addressed:  Elevated serum creatinine: complicated acute illness or injury  Gastroesophageal reflux disease, unspecified whether esophagitis present: complicated acute illness or injury  Methamphetamine use: complicated acute illness or injury  Tobacco use: complicated acute illness or injury    Amount and/or Complexity of Data Reviewed  Labs:  Decision-making details documented in ED Course.  ECG/medicine tests: ordered.    Risk  OTC drugs.  Prescription drug management.        Final diagnoses:   Gastroesophageal reflux disease, unspecified whether esophagitis present   Elevated serum creatinine   Methamphetamine use   Tobacco use       ED Disposition  ED Disposition       ED Disposition   Discharge    Condition   Stable    Comment   --               Marli Astorga, APRN  [address] [facility] [address]  [facility] [id number] [phone]    Schedule an appointment as soon as possible for a visit in 1 week  For recheck of kidney function.  Your creatinine today was 1.56.  Sooner if needed.         Medication List        New Prescriptions      famotidine 40 MG tablet  Commonly known as: Pepcid  Take 1 tablet by mouth Daily for 14 days.               Where to Get Your Medications        These medications were sent to [facility] [id number]  [place] - [address] - [phone]  - [phone] FX [address] [facility] [address],  [place] [address]      Phone: [phone]   famotidine 40 MG tablet            Alli Link MD  01/27/24 0541

## 2024-01-27 NOTE — DISCHARGE INSTRUCTIONS
Medication as directed.  Recommend you discontinue methamphetamine use.  There are multiple serious health risk factors associated with methamphetamines.  Recommend to stop smoking.  Follow-up with your PCP as above.  Return to ED for medical emergencies.

## 2024-01-27 NOTE — ED NOTES
Patient found resting comfortably. Repeat troponin collected. Vitals updated. No complaints of pain at this time. Patient unable to provide urine at this time.

## 2024-02-01 DIAGNOSIS — I10 ESSENTIAL HYPERTENSION: ICD-10-CM

## 2024-02-01 RX ORDER — AMLODIPINE BESYLATE 5 MG/1
5 TABLET ORAL DAILY
Qty: 30 TABLET | Refills: 1 | Status: SHIPPED | OUTPATIENT
Start: 2024-02-01

## 2024-02-01 NOTE — TELEPHONE ENCOUNTER
Rx Refill Note  Requested Prescriptions     Pending Prescriptions Disp Refills    amLODIPine (NORVASC) 5 MG tablet 30 tablet 1     Sig: Take 1 tablet by mouth Daily.      Last office visit with prescribing clinician: 9/5/2023   Last telemedicine visit with prescribing clinician: Visit date not found   Next office visit with prescribing clinician: Visit date not found                         Would you like a call back once the refill request has been completed: [] Yes [] No    If the office needs to give you a call back, can they leave a voicemail: [] Yes [] No    Bhanu Way, PCT  02/01/24, 09:59 EST

## 2024-08-10 DIAGNOSIS — I10 ESSENTIAL HYPERTENSION: ICD-10-CM

## 2024-08-10 DIAGNOSIS — R60.9 DEPENDENT EDEMA: ICD-10-CM

## 2024-08-12 RX ORDER — LISINOPRIL AND HYDROCHLOROTHIAZIDE 25; 20 MG/1; MG/1
1 TABLET ORAL DAILY
Qty: 60 TABLET | Refills: 0 | Status: SHIPPED | OUTPATIENT
Start: 2024-08-12

## 2024-08-12 NOTE — TELEPHONE ENCOUNTER
Rx Refill Note  Requested Prescriptions     Pending Prescriptions Disp Refills    lisinopril-hydrochlorothiazide (PRINZIDE,ZESTORETIC) 20-25 MG per tablet [Pharmacy Med Name: LISINOPRIL-HCTZ 20-25 MG TAB] 60 tablet 0     Sig: TAKE 1 TABLET BY MOUTH DAILY      Last office visit with prescribing clinician: 9/5/2023   Last telemedicine visit with prescribing clinician: Visit date not found   Next office visit with prescribing clinician: Visit date not found                         Would you like a call back once the refill request has been completed: [] Yes [] No    If the office needs to give you a call back, can they leave a voicemail: [] Yes [] No    Bhanu Frias MA  08/12/24, 10:35 EDT

## 2025-01-14 ENCOUNTER — APPOINTMENT (OUTPATIENT)
Dept: GENERAL RADIOLOGY | Facility: HOSPITAL | Age: 45
End: 2025-01-14
Payer: COMMERCIAL

## 2025-01-14 ENCOUNTER — HOSPITAL ENCOUNTER (EMERGENCY)
Facility: HOSPITAL | Age: 45
Discharge: HOME | End: 2025-01-14
Attending: EMERGENCY MEDICINE | Admitting: EMERGENCY MEDICINE
Payer: COMMERCIAL

## 2025-01-14 VITALS
DIASTOLIC BLOOD PRESSURE: 91 MMHG | WEIGHT: 200 LBS | BODY MASS INDEX: 39.27 KG/M2 | RESPIRATION RATE: 16 BRPM | HEIGHT: 60 IN | TEMPERATURE: 98.7 F | SYSTOLIC BLOOD PRESSURE: 136 MMHG | OXYGEN SATURATION: 97 % | HEART RATE: 100 BPM

## 2025-01-14 DIAGNOSIS — E87.6 HYPOKALEMIA: ICD-10-CM

## 2025-01-14 DIAGNOSIS — R03.0 ELEVATED BLOOD PRESSURE READING: ICD-10-CM

## 2025-01-14 DIAGNOSIS — R10.33 PERIUMBILICAL ABDOMINAL PAIN: Primary | ICD-10-CM

## 2025-01-14 DIAGNOSIS — K59.00 CONSTIPATION, UNSPECIFIED CONSTIPATION TYPE: ICD-10-CM

## 2025-01-14 LAB
ALBUMIN SERPL-MCNC: 4.2 G/DL (ref 3.5–5.2)
ALBUMIN/GLOB SERPL: 1.2 G/DL
ALP SERPL-CCNC: 18 U/L (ref 39–117)
ALT SERPL W P-5'-P-CCNC: 17 U/L (ref 1–33)
ANION GAP SERPL CALCULATED.3IONS-SCNC: 14.5 MMOL/L (ref 5–15)
AST SERPL-CCNC: 16 U/L (ref 1–32)
BASOPHILS # BLD AUTO: 0.05 10*3/MM3 (ref 0–0.2)
BASOPHILS NFR BLD AUTO: 0.4 % (ref 0–1.5)
BILIRUB SERPL-MCNC: 0.4 MG/DL (ref 0–1.2)
BUN SERPL-MCNC: 14 MG/DL (ref 6–20)
BUN/CREAT SERPL: 12.7 (ref 7–25)
CALCIUM SPEC-SCNC: 9.4 MG/DL (ref 8.6–10.5)
CHLORIDE SERPL-SCNC: 101 MMOL/L (ref 98–107)
CO2 SERPL-SCNC: 23.5 MMOL/L (ref 22–29)
CREAT SERPL-MCNC: 1.1 MG/DL (ref 0.57–1)
DEPRECATED RDW RBC AUTO: 43.4 FL (ref 37–54)
EGFRCR SERPLBLD CKD-EPI 2021: 64.5 ML/MIN/1.73
EOSINOPHIL # BLD AUTO: 0.1 10*3/MM3 (ref 0–0.4)
EOSINOPHIL NFR BLD AUTO: 0.8 % (ref 0.3–6.2)
ERYTHROCYTE [DISTWIDTH] IN BLOOD BY AUTOMATED COUNT: 12.9 % (ref 12.3–15.4)
GLOBULIN UR ELPH-MCNC: 3.5 GM/DL
GLUCOSE SERPL-MCNC: 139 MG/DL (ref 65–99)
HCT VFR BLD AUTO: 44.9 % (ref 34–46.6)
HGB BLD-MCNC: 15.2 G/DL (ref 12–15.9)
HOLD SPECIMEN: NORMAL
HOLD SPECIMEN: NORMAL
IMM GRANULOCYTES # BLD AUTO: 0.03 10*3/MM3 (ref 0–0.05)
IMM GRANULOCYTES NFR BLD AUTO: 0.2 % (ref 0–0.5)
LIPASE SERPL-CCNC: 15 U/L (ref 13–60)
LYMPHOCYTES # BLD AUTO: 2.62 10*3/MM3 (ref 0.7–3.1)
LYMPHOCYTES NFR BLD AUTO: 20.9 % (ref 19.6–45.3)
MCH RBC QN AUTO: 31 PG (ref 26.6–33)
MCHC RBC AUTO-ENTMCNC: 33.9 G/DL (ref 31.5–35.7)
MCV RBC AUTO: 91.6 FL (ref 79–97)
MONOCYTES # BLD AUTO: 0.73 10*3/MM3 (ref 0.1–0.9)
MONOCYTES NFR BLD AUTO: 5.8 % (ref 5–12)
NEUTROPHILS NFR BLD AUTO: 71.9 % (ref 42.7–76)
NEUTROPHILS NFR BLD AUTO: 8.98 10*3/MM3 (ref 1.7–7)
NRBC BLD AUTO-RTO: 0 /100 WBC (ref 0–0.2)
PLATELET # BLD AUTO: 352 10*3/MM3 (ref 140–450)
PMV BLD AUTO: 9.5 FL (ref 6–12)
POTASSIUM SERPL-SCNC: 3.3 MMOL/L (ref 3.5–5.2)
PROT SERPL-MCNC: 7.7 G/DL (ref 6–8.5)
RBC # BLD AUTO: 4.9 10*6/MM3 (ref 3.77–5.28)
SODIUM SERPL-SCNC: 139 MMOL/L (ref 136–145)
WBC NRBC COR # BLD AUTO: 12.51 10*3/MM3 (ref 3.4–10.8)
WHOLE BLOOD HOLD COAG: NORMAL
WHOLE BLOOD HOLD SPECIMEN: NORMAL

## 2025-01-14 PROCEDURE — 85025 COMPLETE CBC W/AUTO DIFF WBC: CPT | Performed by: EMERGENCY MEDICINE

## 2025-01-14 PROCEDURE — 96374 THER/PROPH/DIAG INJ IV PUSH: CPT

## 2025-01-14 PROCEDURE — 80053 COMPREHEN METABOLIC PANEL: CPT | Performed by: EMERGENCY MEDICINE

## 2025-01-14 PROCEDURE — 25010000002 HYDRALAZINE PER 20 MG: Performed by: EMERGENCY MEDICINE

## 2025-01-14 PROCEDURE — 99283 EMERGENCY DEPT VISIT LOW MDM: CPT | Performed by: EMERGENCY MEDICINE

## 2025-01-14 PROCEDURE — 83690 ASSAY OF LIPASE: CPT | Performed by: EMERGENCY MEDICINE

## 2025-01-14 PROCEDURE — 74018 RADEX ABDOMEN 1 VIEW: CPT

## 2025-01-14 RX ORDER — SODIUM CHLORIDE 0.9 % (FLUSH) 0.9 %
10 SYRINGE (ML) INJECTION AS NEEDED
Status: DISCONTINUED | OUTPATIENT
Start: 2025-01-14 | End: 2025-01-14 | Stop reason: HOSPADM

## 2025-01-14 RX ORDER — CLONIDINE HYDROCHLORIDE 0.1 MG/1
0.1 TABLET ORAL ONCE
Status: COMPLETED | OUTPATIENT
Start: 2025-01-14 | End: 2025-01-14

## 2025-01-14 RX ORDER — DICYCLOMINE HCL 20 MG
20 TABLET ORAL EVERY 8 HOURS PRN
Qty: 20 TABLET | Refills: 0 | Status: SHIPPED | OUTPATIENT
Start: 2025-01-14

## 2025-01-14 RX ORDER — POLYETHYLENE GLYCOL 3350 17 G/17G
17 POWDER, FOR SOLUTION ORAL EVERY 8 HOURS
Qty: 116 G | Refills: 0 | Status: SHIPPED | OUTPATIENT
Start: 2025-01-14 | End: 2025-01-16

## 2025-01-14 RX ORDER — POTASSIUM CHLORIDE 750 MG/1
10 TABLET, EXTENDED RELEASE ORAL EVERY 12 HOURS
Qty: 10 TABLET | Refills: 0 | Status: SHIPPED | OUTPATIENT
Start: 2025-01-14 | End: 2025-01-19

## 2025-01-14 RX ORDER — HYDRALAZINE HYDROCHLORIDE 20 MG/ML
10 INJECTION INTRAMUSCULAR; INTRAVENOUS ONCE
Status: COMPLETED | OUTPATIENT
Start: 2025-01-14 | End: 2025-01-14

## 2025-01-14 RX ADMIN — CLONIDINE HYDROCHLORIDE 0.1 MG: 0.1 TABLET ORAL at 08:18

## 2025-01-14 RX ADMIN — HYDRALAZINE HYDROCHLORIDE 10 MG: 20 INJECTION INTRAMUSCULAR; INTRAVENOUS at 08:17

## 2025-01-14 NOTE — ED PROVIDER NOTES
Subjective   History of Present Illness  Patient presents complaining of abdominal pain that started yesterday afternoon.  Patient denies any recent travel, sick contact, bad food exposure, or trauma.  Patient's pain is described as supraumbilical/epigastric without any radiation.  Patient denies any nausea, vomiting, constipation or diarrhea.  Patient had a bowel movement yesterday that was unremarkable.  Patient denies any blood in her stool.  Patient did take ibuprofen without any help.  No other oral therapies were tried.  Nothing seems to make it better or worse.  Patient says it occurs at intervals with brief peaks of intense pain and then reduces to an ache.  Patient describes it as eating too much and thanksgiving feeling very bloated.  Patient does have a history of gallstones.  Patient's had her appendix removed.  Patient able to tolerate p.o. food and fluid.      Review of Systems   All other systems reviewed and are negative.      Past Medical History:   Diagnosis Date    Ankle pain, left     torn ligaments    GERD (gastroesophageal reflux disease)     Headache     Hypertension        No Known Allergies    Past Surgical History:   Procedure Laterality Date    APPENDECTOMY  1999    DILATATION AND CURETTAGE  2004    OOPHORECTOMY  2004    TUBAL ABDOMINAL LIGATION  2005       Family History   Problem Relation Age of Onset    No Known Problems Mother     Other Father         Health History unknown       Social History     Socioeconomic History    Marital status: Single   Tobacco Use    Smoking status: Every Day     Current packs/day: 0.50     Types: Cigarettes    Smokeless tobacco: Never   Vaping Use    Vaping status: Never Used   Substance and Sexual Activity    Alcohol use: No    Drug use: No    Sexual activity: Defer           Objective   Physical Exam  Vitals and nursing note reviewed.   HENT:      Head: Atraumatic.      Mouth/Throat:      Pharynx: Oropharynx is clear.   Cardiovascular:      Rate and  Rhythm: Normal rate and regular rhythm.      Pulses:           Radial pulses are 2+ on the right side and 2+ on the left side.      Heart sounds: S1 normal and S2 normal. No murmur heard.  Pulmonary:      Effort: Pulmonary effort is normal.      Breath sounds: Normal breath sounds.   Abdominal:      General: Abdomen is protuberant. There is no distension or abdominal bruit.      Palpations: Abdomen is soft. There is no pulsatile mass.      Tenderness: There is no abdominal tenderness. There is no right CVA tenderness, left CVA tenderness or guarding. Negative signs include Covington's sign.      Hernia: There is no hernia in the umbilical area.      Comments: Active bowel sounds in all 4 quadrants.  No high-pitched sounds.   Musculoskeletal:      Right lower leg: No edema.      Left lower leg: No edema.   Skin:     General: Skin is warm and dry.      Capillary Refill: Capillary refill takes 2 to 3 seconds.   Neurological:      Mental Status: She is alert and oriented to person, place, and time.   Psychiatric:         Mood and Affect: Mood normal.         Behavior: Behavior normal.         Procedures           ED Course                                                       Medical Decision Making  Ddx gastritis, enteritis, colitis, constipation, increased bowel gas, cholecystitis, pancreatitis, small bowel obstruction    XR Abdomen 1 View    Result Date: 1/14/2025  1.A nonspecific bowel gas pattern is observed. A moderate stool burden is seen throughout the colon. Electronically Signed: George Floyd MD  1/14/2025 8:20 AM EST  Workstation ID: [id number]    Labs Reviewed  COMPREHENSIVE METABOLIC PANEL - Abnormal; Notable for the following components:     Glucose                       139 (*)                Creatinine                    1.10 (*)               Potassium                     3.3 (*)                Alkaline Phosphatase          18 (*)              All other components within normal limits         Narrative:  GFR Categories in Chronic Kidney Disease (CKD)                                      GFR Category          GFR (mL/min/1.73)    Interpretation                  G1                     90 or greater         Normal or high (1)                  G2                      60-89                Mild decrease (1)                  G3a                   45-59                Mild to moderate decrease                  G3b                   30-44                Moderate to severe decrease                  G4                    15-29                Severe decrease                  G5                    14 or less           Kidney failure                                          (1)In the absence of evidence of kidney disease, neither GFR category G1 or G2 fulfill the criteria for CKD.                                    eGFR calculation 2021 CKD-EPI creatinine equation, which does not include race as a factor  CBC WITH AUTO DIFFERENTIAL - Abnormal; Notable for the following components:     WBC                           12.51 (*)               Neutrophils, Absolute         8.98 (*)            All other components within normal limits  LIPASE - Normal  RAINBOW DRAW         Narrative: The following orders were created for panel order [place] Draw.                  Procedure                               Abnormality         Status                                     ---------                               -----------         ------                                     Green Top (Gel)[[id number]]                                  Final result                               Lavender Top[[id number]]                                     Final result                               Gold Top - SST[[id number]]                                   Final result                               Light Blue Top[[id number]]                                   Final result                                                 Please view results for these tests on the  individual orders.  URINALYSIS W/ MICROSCOPIC IF INDICATED (NO CULTURE)  CBC AND DIFFERENTIAL         Narrative: The following orders were created for panel order CBC & Differential.                  Procedure                               Abnormality         Status                                     ---------                               -----------         ------                                     CBC Auto Differential[[id number]]        Abnormal            Final result                                                 Please view results for these tests on the individual orders.  GREEN TOP  LAVENDER TOP  GOLD TOP - [facility]  LIGHT BLUE TOP    0830 Pt seen again prior to d/c.  Labs/Imaging reviewed and are remarkable moderate stool burden but no fever or elevated white blood cell count.  Patient's symptoms are consistent with colicky bowel pain.  Patient will be started on a stool softener and some Bentyl and advised to follow-up should she develop any fever, vomiting, diarrhea, or blood in stool.  Blood pressure improved and pt. in NAD. Non-toxic. Comfortable. Ambulating without difficulty.  Tolerating po.  Relaxed breathing.  All questions personally answered at the bedside and all d/c instructions personally reviewed with pt.  Discussed the importance of close outpt. f/u and pt. understands this and agrees to do so.  Pt agrees to return to ED immediately for any new, persistent, or worsening symptoms.    EMR Dragon/Transcription disclaimer:  Much of this encounter note is an electronic transcription/translation of spoken language to printed text using the Dragon Dictation System       Problems Addressed:  Constipation, unspecified constipation type: complicated acute illness or injury  Elevated blood pressure reading: complicated acute illness or injury  Periumbilical abdominal pain: complicated acute illness or injury    Amount and/or Complexity of Data Reviewed  Labs: ordered.  Radiology:  ordered.    Risk  Prescription drug management.        Final diagnoses:   Periumbilical abdominal pain   Constipation, unspecified constipation type   Elevated blood pressure reading   Hypokalemia       ED Disposition  ED Disposition       ED Disposition   Discharge    Condition   Stable    Comment   --               Marli Astorga, APRN  [address]  [address]  [place] [address] [phone]    In 3 days  If symptoms worsen         Medication List        New Prescriptions      dicyclomine 20 MG tablet  Commonly known as: BENTYL  Take 1 tablet by mouth Every 8 (Eight) Hours As Needed for Abdominal Cramping.     polyethylene glycol 17 GM/SCOOP powder  Commonly known as: MIRALAX  Take 17 g by mouth Every 8 (Eight) Hours for 2 days. Take with 16oz of water.     potassium chloride 10 MEQ CR tablet  Take 1 tablet by mouth Every 12 (Twelve) Hours for 5 days.               Where to Get Your Medications        [facility] medications [id number] [place] [facility] [address] - [phone]  - [phone] FX  [address], [facility] [address]      Phone: [phone]   dicyclomine 20 MG tablet  polyethylene glycol 17 GM/SCOOP powder  potassium chloride 10 MEQ CR tablet            Sandeep Wilson MD  01/14/25 0894

## 2025-01-14 NOTE — Clinical Note
[facility] EMERGENCY DEPARTMENT  [address] [place] [address]  Phone: [phone]    Sofy Martines was seen and treated in our emergency department on 1/14/2025.  She may return to work on 01/17/2025.         Thank you for choosing [facility].    Sandeep Wilson MD

## 2025-01-14 NOTE — Clinical Note
[facility] EMERGENCY DEPARTMENT  [address] [address]  Phone: [phone]    Sofy Martines was seen and treated in our emergency department on 1/14/2025.  She may return to work on 01/17/2025.         Thank you for choosing [facility].    Sandeep Wilson MD

## 2025-01-27 ENCOUNTER — OFFICE VISIT (OUTPATIENT)
Dept: INTERNAL MEDICINE | Facility: CLINIC | Age: 45
End: 2025-01-27
Payer: COMMERCIAL

## 2025-01-27 VITALS
TEMPERATURE: 98.4 F | WEIGHT: 223 LBS | SYSTOLIC BLOOD PRESSURE: 128 MMHG | HEIGHT: 60 IN | OXYGEN SATURATION: 98 % | BODY MASS INDEX: 43.78 KG/M2 | HEART RATE: 94 BPM | DIASTOLIC BLOOD PRESSURE: 92 MMHG

## 2025-01-27 DIAGNOSIS — F41.1 GAD (GENERALIZED ANXIETY DISORDER): Primary | ICD-10-CM

## 2025-01-27 PROCEDURE — 1159F MED LIST DOCD IN RCRD: CPT | Performed by: NURSE PRACTITIONER

## 2025-01-27 PROCEDURE — 3080F DIAST BP >= 90 MM HG: CPT | Performed by: NURSE PRACTITIONER

## 2025-01-27 PROCEDURE — 99214 OFFICE O/P EST MOD 30 MIN: CPT | Performed by: NURSE PRACTITIONER

## 2025-01-27 PROCEDURE — 3074F SYST BP LT 130 MM HG: CPT | Performed by: NURSE PRACTITIONER

## 2025-01-27 PROCEDURE — 1126F AMNT PAIN NOTED NONE PRSNT: CPT | Performed by: NURSE PRACTITIONER

## 2025-01-27 PROCEDURE — 1160F RVW MEDS BY RX/DR IN RCRD: CPT | Performed by: NURSE PRACTITIONER

## 2025-01-27 RX ORDER — BUPROPION HYDROCHLORIDE 150 MG/1
150 TABLET ORAL EVERY MORNING
Qty: 30 TABLET | Refills: 1 | Status: SHIPPED | OUTPATIENT
Start: 2025-01-27

## 2025-01-27 NOTE — PROGRESS NOTES
"Chief Complaint   Patient presents with    Anxiety     Would like to discuss going back on wellbutrin       SUBJECTIVE  Sofy Martines is a 44 y.o. female presenting today for follow up of her chronic health conditions.    She presents today reporting anxiety. She reports the onset of this as >10yrs ago although this is our first discussion re this issue. She has been prescribed Wellbutrin in the past which worked well for her. She described nervousness and \"flooded w/ emotions.\" Denies panic attacks. 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 [place]. She meets w/ a counselor through [facility].    Outpatient Medications Marked as Taking for the 1/27/25 encounter (Office Visit) with Marli Astorga APRN   Medication Sig Dispense Refill    amLODIPine (NORVASC) 5 MG tablet Take 1 tablet by mouth Daily. 30 tablet 1    dicyclomine (BENTYL) 20 MG tablet Take 1 tablet by mouth Every 8 (Eight) Hours As Needed for Abdominal Cramping. 20 tablet 0    lisinopril-hydrochlorothiazide (PRINZIDE,ZESTORETIC) 20-25 MG per tablet TAKE 1 TABLET BY MOUTH DAILY 60 tablet 0         The following portions of the patient's history were reviewed and updated as appropriate: allergies, current medications, past family history, past medical history, past social history, past surgical history and problem list.    Review of Systems   Constitutional:  Negative for chills and diaphoresis.   HENT:  Negative for congestion.    Respiratory:  Negative for cough.    Cardiovascular:  Negative for chest pain.   Gastrointestinal:  Negative for abdominal pain.   Psychiatric/Behavioral:  Negative for self-injury and suicidal ideas.      PHQ-9 Depression Screening  Little interest or pleasure in doing things? Several days   Feeling down, depressed, or hopeless? Several days   PHQ-2 Total Score 2   Trouble falling or staying asleep, or sleeping too much? Almost all   Feeling tired or having little energy? Several days   Poor appetite or " "overeating? Not at all   Feeling bad about yourself - or that you are a failure or have let yourself or your family down? Not at all   Trouble concentrating on things, such as reading the newspaper or watching television? Several days   Moving or speaking so slowly that other people could have noticed? Or the opposite - being so fidgety or restless that you have been moving around a lot more than usual? Not at all   Thoughts that you would be better off dead, or of hurting yourself in some way? Not at all   PHQ-9 Total Score 7   If you checked off any problems, how difficult have these problems made it for you to do your work, take care of things at home, or get along with other people? Somewhat difficult       Over the last two weeks, how often have you been bothered by the following problems?  Feeling nervous, anxious or on edge: Several days  Not being able to stop or control worrying: Nearly every day  Worrying too much about different things: Nearly every day  Trouble Relaxing: Several days  Being so restless that it is hard to sit still: Not at all  Becoming easily annoyed or irritable: Not at all  Feeling afraid as if something awful might happen: Several days  KIRTI 7 Total Score: 9  If you checked any problems, how difficult have these problems made it for you to do your work, take care of things at home, or get along with other people: Somewhat difficult      Objective   Vitals:    01/27/25 1435   BP: 128/92   BP Location: Left arm   Patient Position: Sitting   Cuff Size: Large Adult   Pulse: 94   Temp: 98.4 °F (36.9 °C)   TempSrc: Infrared   SpO2: 98%   Weight: 101 kg (223 lb)   Height: 152.4 cm (60\")       BP Readings from Last 3 Encounters:   01/27/25 128/92   01/14/25 136/91   01/27/24 126/74        Wt Readings from Last 3 Encounters:   01/27/25 101 kg (223 lb)   01/14/25 90.7 kg (200 lb)   01/27/24 72.6 kg (160 lb)        Body mass index is 43.55 kg/m².  Nursing notes and vitals reviewed.    Physical " Exam  Constitutional:       General: She is not in acute distress.     Appearance: She is well-developed.   Cardiovascular:      Rate and Rhythm: Regular rhythm.      Heart sounds: S1 normal and S2 normal.   Pulmonary:      Effort: Pulmonary effort is normal.      Breath sounds: Normal breath sounds.   Neurological:      Mental Status: She is alert and oriented to person, place, and time.   Psychiatric:         Attention and Perception: She is attentive.         Behavior: Behavior normal.         Thought Content: Thought content normal.           Data Reviewed:  Recent Results (from the past 4 weeks)   Comprehensive Metabolic Panel    Collection Time: 01/14/25  6:30 AM    Specimen: Blood   Result Value Ref Range    Glucose 139 (H) 65 - 99 mg/dL    BUN 14 6 - 20 mg/dL    Creatinine 1.10 (H) 0.57 - 1.00 mg/dL    Sodium 139 136 - 145 mmol/L    Potassium 3.3 (L) 3.5 - 5.2 mmol/L    Chloride 101 98 - 107 mmol/L    CO2 23.5 22.0 - 29.0 mmol/L    Calcium 9.4 8.6 - 10.5 mg/dL    Total Protein 7.7 6.0 - 8.5 g/dL    Albumin 4.2 3.5 - 5.2 g/dL    ALT (SGPT) 17 1 - 33 U/L    AST (SGOT) 16 1 - 32 U/L    Alkaline Phosphatase 18 (L) 39 - 117 U/L    Total Bilirubin 0.4 0.0 - 1.2 mg/dL    Globulin 3.5 gm/dL    A/G Ratio 1.2 g/dL    BUN/Creatinine Ratio 12.7 7.0 - 25.0    Anion Gap 14.5 5.0 - 15.0 mmol/L    eGFR 64.5 >60.0 mL/min/1.73   Lipase    Collection Time: 01/14/25  6:30 AM    Specimen: Blood   Result Value Ref Range    Lipase 15 13 - 60 U/L   Green Top (Gel)    Collection Time: 01/14/25  6:30 AM   Result Value Ref Range    Extra Tube Hold for add-ons.    Lavender Top    Collection Time: 01/14/25  6:30 AM   Result Value Ref Range    Extra Tube hold for add-on    Gold Top - SST    Collection Time: 01/14/25  6:30 AM   Result Value Ref Range    Extra Tube Hold for add-ons.    Light Blue Top    Collection Time: 01/14/25  6:30 AM   Result Value Ref Range    Extra Tube Hold for add-ons.    CBC Auto Differential    Collection Time:  01/14/25  6:30 AM    Specimen: Blood   Result Value Ref Range    WBC 12.51 (H) 3.40 - 10.80 10*3/mm3    RBC 4.90 3.77 - 5.28 10*6/mm3    Hemoglobin 15.2 12.0 - 15.9 g/dL    Hematocrit 44.9 34.0 - 46.6 %    MCV 91.6 79.0 - 97.0 fL    MCH 31.0 26.6 - 33.0 pg    MCHC 33.9 31.5 - 35.7 g/dL    RDW 12.9 12.3 - 15.4 %    RDW-SD 43.4 37.0 - 54.0 fl    MPV 9.5 6.0 - 12.0 fL    Platelets 352 140 - 450 10*3/mm3    Neutrophil % 71.9 42.7 - 76.0 %    Lymphocyte % 20.9 19.6 - 45.3 %    Monocyte % 5.8 5.0 - 12.0 %    Eosinophil % 0.8 0.3 - 6.2 %    Basophil % 0.4 0.0 - 1.5 %    Immature Grans % 0.2 0.0 - 0.5 %    Neutrophils, Absolute 8.98 (H) 1.70 - 7.00 10*3/mm3    Lymphocytes, Absolute 2.62 0.70 - 3.10 10*3/mm3    Monocytes, Absolute 0.73 0.10 - 0.90 10*3/mm3    Eosinophils, Absolute 0.10 0.00 - 0.40 10*3/mm3    Basophils, Absolute 0.05 0.00 - 0.20 10*3/mm3    Immature Grans, Absolute 0.03 0.00 - 0.05 10*3/mm3    nRBC 0.0 0.0 - 0.2 /100 WBC             Assessment & Plan  KIRTI (generalized anxiety disorder)  Psychological condition is  chronic although new to me .  Start Wellbutrin  Psychological condition  will be reassessed  6 weeks .    - pt will continue rehab program  - pt will continue counseling services through [address] [place] [address]    Orders:    buPROPion XL (WELLBUTRIN XL) 150 MG 24 hr tablet; Take 1 tablet by mouth Every Morning.          Medications, including side effects, were discussed with the patient. Patient verbalized understanding.  The plan of care was discussed. All questions were answered. Patient verbalized understanding.      Return in about 6 weeks (around 3/10/2025).

## 2025-02-02 ENCOUNTER — HOSPITAL ENCOUNTER (EMERGENCY)
Facility: HOSPITAL | Age: 45
Discharge: HOME | End: 2025-02-02
Attending: EMERGENCY MEDICINE | Admitting: EMERGENCY MEDICINE
Payer: COMMERCIAL

## 2025-02-02 VITALS
HEIGHT: 60 IN | OXYGEN SATURATION: 97 % | SYSTOLIC BLOOD PRESSURE: 121 MMHG | DIASTOLIC BLOOD PRESSURE: 81 MMHG | RESPIRATION RATE: 18 BRPM | TEMPERATURE: 100 F | HEART RATE: 90 BPM | BODY MASS INDEX: 39.27 KG/M2 | WEIGHT: 200 LBS

## 2025-02-02 DIAGNOSIS — J10.1 INFLUENZA A: Primary | ICD-10-CM

## 2025-02-02 LAB
FLUAV RNA RESP QL NAA+PROBE: DETECTED
FLUBV RNA RESP QL NAA+PROBE: NOT DETECTED
RSV RNA RESP QL NAA+PROBE: NOT DETECTED
SARS-COV-2 RNA RESP QL NAA+PROBE: NOT DETECTED

## 2025-02-02 PROCEDURE — 99283 EMERGENCY DEPT VISIT LOW MDM: CPT | Performed by: EMERGENCY MEDICINE

## 2025-02-02 PROCEDURE — 87637 SARSCOV2&INF A&B&RSV AMP PRB: CPT

## 2025-02-02 RX ORDER — METOPROLOL TARTRATE 25 MG/1
TABLET, FILM COATED ORAL
Status: DISCONTINUED
Start: 2025-02-02 | End: 2025-02-02 | Stop reason: WASHOUT

## 2025-02-02 RX ORDER — OSELTAMIVIR PHOSPHATE 75 MG/1
75 CAPSULE ORAL 2 TIMES DAILY
Qty: 10 CAPSULE | Refills: 0 | Status: SHIPPED | OUTPATIENT
Start: 2025-02-02

## 2025-02-02 RX ORDER — OSELTAMIVIR PHOSPHATE 75 MG/1
CAPSULE ORAL
Status: COMPLETED
Start: 2025-02-02 | End: 2025-02-02

## 2025-02-02 RX ORDER — OSELTAMIVIR PHOSPHATE 75 MG/1
75 CAPSULE ORAL ONCE
Status: COMPLETED | OUTPATIENT
Start: 2025-02-02 | End: 2025-02-02

## 2025-02-02 RX ADMIN — OSELTAMIVIR PHOSPHATE 75 MG: 75 CAPSULE ORAL at 15:22

## 2025-02-02 NOTE — Clinical Note
[facility] EMERGENCY DEPARTMENT  [address]  [place] [address]  Phone: [phone]    Sofy Martines was seen and treated in our emergency department on 2/2/2025.  She may return to work on 02/05/2025.         Thank you for choosing [facility].    Isaiah Doan MD

## 2025-02-02 NOTE — ED PROVIDER NOTES
"Subjective   History of Present Illness    Chief complaint: Fever chills and body ache    Location: Generalized    Quality/Severity: Subjective fever    Timing/Onset/Duration: Over the last 2 days    Modifying Factors: No modifying factor    Associated Symptoms: Mild headache.  Patient said fever chills and cough.  Patient had a slightly scratchy throat.  No earache or nasal congestion.  No chest pain or shortness of breath.  No abdominal pain.  No diarrhea or burning when she urinates.  No nausea or vomiting.    Narrative: This 44-year-old call 911 for bodyaches and chills.  Patient states he does not feel well.  He took ibuprofen prior to arrival.  Walked to the ambulance.  Patient states she is [place] \"drug court\" and has to have a piece of paper with any meds she gets on it.  Patient is been symptomatic for the last 2 days.    PCP:Marli Astorga APRN      Review of Systems   Constitutional:  Positive for chills and fever (Subjective).   HENT:  Positive for sore throat (Scratchy throat). Negative for congestion.    Respiratory:  Positive for cough. Negative for shortness of breath.    Cardiovascular:  Negative for chest pain.   Gastrointestinal:  Negative for abdominal pain, nausea and vomiting.   Genitourinary:  Negative for difficulty urinating and dysuria.   Musculoskeletal:  Positive for myalgias. Negative for back pain.   Skin:  Negative for rash.   Neurological:  Positive for headaches (Mild).       Past Medical History:   Diagnosis Date   • ADHD (attention deficit hyperactivity disorder) 2/20/2008   • Ankle pain, left     torn ligaments   • Cholelithiasis 2017   • GERD (gastroesophageal reflux disease)    • Headache    • Hypertension    • Renal insufficiency        No Known Allergies    Past Surgical History:   Procedure Laterality Date   • APPENDECTOMY  1999   • DILATATION AND CURETTAGE  2004   • OOPHORECTOMY  2004   • TUBAL ABDOMINAL LIGATION  2005       Family History   Problem Relation Age of Onset "   • No Known Problems Mother        Social History     Socioeconomic History   • Marital status: Single   Tobacco Use   • Smoking status: Every Day     Current packs/day: 0.20     Average packs/day: 0.5 packs/day for 10.1 years (5.0 ttl pk-yrs)     Types: Cigarettes     Start date: 1/13/2015     Passive exposure: Never   • Smokeless tobacco: Never   Vaping Use   • Vaping status: Never Used   Substance and Sexual Activity   • Alcohol use: No   • Drug use: No   • Sexual activity: Yes     Partners: Male           Objective   Physical Exam  Vitals (The blood pressure is 121/81, temperature is 100 ° Fahrenheit, heart rate 108, respirations 18, room air pulse ox 95%) and nursing note reviewed.   Constitutional:       Appearance: Normal appearance.   HENT:      Head: Normocephalic and atraumatic.      Right Ear: Tympanic membrane normal.      Left Ear: Tympanic membrane normal.      Nose: Nose normal.      Mouth/Throat:      Mouth: Mucous membranes are moist.   Cardiovascular:      Rate and Rhythm: Normal rate and regular rhythm.      Pulses: Normal pulses.      Heart sounds: Normal heart sounds. No murmur heard.     No friction rub. No gallop.   Pulmonary:      Effort: Pulmonary effort is normal.      Breath sounds: Normal breath sounds.   Abdominal:      General: Abdomen is flat. Bowel sounds are normal. There is no distension.      Palpations: Abdomen is soft. There is no mass.      Tenderness: There is no abdominal tenderness. There is no right CVA tenderness, left CVA tenderness, guarding or rebound.      Hernia: No hernia is present.   Musculoskeletal:         General: No swelling or tenderness. Normal range of motion.      Cervical back: Normal range of motion and neck supple.   Skin:     General: Skin is warm and dry.   Neurological:      General: No focal deficit present.      Mental Status: She is alert and oriented to person, place, and time.       Procedures           ED Course  ED Course as of 02/02/25 1601    Sun Feb 02, 2025   1537 The COVID flu is positive for flu A. [RC]      ED Course User Index  [RC] Isaiah Doan MD    16:03 EST, 02/02/25:  The patient's diagnosis of influenza A was discussed with her.  Will be given a dose of Tamiflu here in the emergency department and a prescription for Tamiflu.  The patient should drink plenty of fluids.  She should take Motrin or Tylenol as needed as directed for fever chills and bodyaches.  Patient should call Marli Astorga in the morning for follow-up appointment within 1 week.  The patient should return to the emergency department there is increasing shortness of breath, no urinary output in 6 to 8 hours, worse in any way at all.  The patient should take Robitussin as needed as directed for cough.  All the patient's question were answered she will be discharged in good condition.                                                   Medical Decision Making      Final diagnoses:   None       ED Disposition  ED Disposition       None            No follow-up provider specified.       Medication List      No changes were made to your prescriptions during this visit.       No orders to display     Labs Reviewed   COVID-19/FLUA&B/RSV, NP SWAB IN TRANSPORT MEDIA 1 HR TAT - Abnormal; Notable for the following components:       Result Value    Influenza A PCR Detected (*)     All other components within normal limits    Narrative:     Fact sheet for providers: https://www.fda.gov/media/078519/download    Fact sheet for patients: https://www.fda.gov/media/079576/download    Test performed by PCR.     XR Abdomen 1 View    Result Date: 1/14/2025  Narrative: XR ABDOMEN 1 VW Date of Exam: 1/14/2025 8:15 AM EST Indication: mid/upper abd pain Comparison: CT abdomen pelvis 9/13/2023 FINDINGS: A nonspecific bowel gas pattern is observed. A moderate stool burden is seen throughout the colon. Gallstones are again noted. No definitive nephrolithiasis is seen. Phleboliths are observed. No  acute osseous abnormalities are noted.     Impression: 1.A nonspecific bowel gas pattern is observed. A moderate stool burden is seen throughout the colon. Electronically Signed: George Floyd MD  1/14/2025 8:20 AM EST  Workstation ID: [id number]     Final diagnoses:   None         ED Medications:  Medications - No data to display    New Medications:     Medication List        ASK your doctor about these medications      amLODIPine 5 MG tablet  Commonly known as: NORVASC  Take 1 tablet by mouth Daily.     buPROPion  MG 24 hr tablet  Commonly known as: WELLBUTRIN XL  Take 1 tablet by mouth Every Morning.     dicyclomine 20 MG tablet  Commonly known as: BENTYL  Take 1 tablet by mouth Every 8 (Eight) Hours As Needed for Abdominal Cramping.     lisinopril-hydrochlorothiazide 20-25 MG per tablet  Commonly known as: PRINZIDE,ZESTORETIC  TAKE 1 TABLET BY MOUTH DAILY              Stopped Medications:     Medication List        ASK your doctor about these medications      amLODIPine 5 MG tablet  Commonly known as: NORVASC  Take 1 tablet by mouth Daily.     buPROPion  MG 24 hr tablet  Commonly known as: WELLBUTRIN XL  Take 1 tablet by mouth Every Morning.     dicyclomine 20 MG tablet  Commonly known as: BENTYL  Take 1 tablet by mouth Every 8 (Eight) Hours As Needed for Abdominal Cramping.     lisinopril-hydrochlorothiazide 20-25 MG per tablet  Commonly known as: PRINZIDE,ZESTORETIC  TAKE 1 TABLET BY MOUTH DAILY                   Isaiah Doan MD  02/03/25 0016

## 2025-03-04 ENCOUNTER — OFFICE VISIT (OUTPATIENT)
Dept: INTERNAL MEDICINE | Facility: CLINIC | Age: 45
End: 2025-03-04
Payer: COMMERCIAL

## 2025-03-04 VITALS
DIASTOLIC BLOOD PRESSURE: 84 MMHG | HEART RATE: 84 BPM | RESPIRATION RATE: 18 BRPM | WEIGHT: 227.6 LBS | TEMPERATURE: 98.2 F | BODY MASS INDEX: 44.68 KG/M2 | OXYGEN SATURATION: 97 % | SYSTOLIC BLOOD PRESSURE: 132 MMHG | HEIGHT: 60 IN

## 2025-03-04 DIAGNOSIS — A08.4 VIRAL GASTROENTERITIS: Primary | ICD-10-CM

## 2025-03-04 PROCEDURE — 1160F RVW MEDS BY RX/DR IN RCRD: CPT | Performed by: INTERNAL MEDICINE

## 2025-03-04 PROCEDURE — 99213 OFFICE O/P EST LOW 20 MIN: CPT | Performed by: INTERNAL MEDICINE

## 2025-03-04 PROCEDURE — 3079F DIAST BP 80-89 MM HG: CPT | Performed by: INTERNAL MEDICINE

## 2025-03-04 PROCEDURE — 1126F AMNT PAIN NOTED NONE PRSNT: CPT | Performed by: INTERNAL MEDICINE

## 2025-03-04 PROCEDURE — 1159F MED LIST DOCD IN RCRD: CPT | Performed by: INTERNAL MEDICINE

## 2025-03-04 PROCEDURE — 3075F SYST BP GE 130 - 139MM HG: CPT | Performed by: INTERNAL MEDICINE

## 2025-03-04 RX ORDER — ONDANSETRON 4 MG/1
4 TABLET, ORALLY DISINTEGRATING ORAL EVERY 8 HOURS PRN
Qty: 30 TABLET | Refills: 2 | Status: SHIPPED | OUTPATIENT
Start: 2025-03-04

## 2025-03-04 RX ORDER — CARIPRAZINE 1.5 MG/1
1.5 CAPSULE, GELATIN COATED ORAL DAILY
COMMUNITY
Start: 2025-02-25

## 2025-03-04 NOTE — PROGRESS NOTES
"Chief Complaint  Headache (X 1 day), Nausea (X 1 day - exp. To virus - had flu x 2 weeks ago), Vomiting (X 1 day), and Diarrhea (X 1 day)    Subjective        Sofy Martines presents to [facility]  Headache  Nausea  Symptoms include headaches, nausea and vomiting.    Vomiting   Associated symptoms include diarrhea and headaches.   Diarrhea   Associated symptoms include headaches and vomiting.       Got diagnosed about 2.5 weeks ago with flu.  Had body aches, chills, HA.  Had nausea and a little vomiting.  Did feel like she got a little better.  Had 3-4 days after flu diagnosis for work on her teeth.  Had a tooth pulled and got antibiotics.      Yesterday she started feeling nauseous and then started vomiting.  Would feel thirsty and the drink water and vomit that up.  Having diarrhea as well.     Objective   Vital Signs:  /84 (BP Location: Left arm, Patient Position: Sitting, Cuff Size: Large Adult)   Pulse 84   Temp 98.2 °F (36.8 °C) (Infrared)   Resp 18   Ht 152.4 cm (60\")   Wt 103 kg (227 lb 9.6 oz)   SpO2 97%   BMI 44.45 kg/m²   Estimated body mass index is 44.45 kg/m² as calculated from the following:    Height as of this encounter: 152.4 cm (60\").    Weight as of this encounter: 103 kg (227 lb 9.6 oz).        Physical Exam  Vitals reviewed.   Constitutional:       General: She is not in acute distress.     Appearance: Normal appearance.   HENT:      Head: Normocephalic and atraumatic.      Nose: Nose normal.      Mouth/Throat:      Mouth: Mucous membranes are moist.   Eyes:      Conjunctiva/sclera: Conjunctivae normal.   Pulmonary:      Effort: Pulmonary effort is normal.   Skin:     General: Skin is warm and dry.   Neurological:      General: No focal deficit present.      Mental Status: She is alert.   Psychiatric:         Mood and Affect: Mood normal.        Result Review :           Assessment and Plan   Diagnoses and all orders for this visit:    1. Viral " gastroenteritis (Primary)  -     ondansetron ODT (ZOFRAN-ODT) 4 MG disintegrating tablet; Place 1 tablet on the tongue Every 8 (Eight) Hours As Needed for Nausea or Vomiting.  Dispense: 30 tablet; Refill: 2      Treat to help with viral GE.  Gave return precautions of diarrhea x1+ week or bloody diarrhea/vomiting.          Follow Up     Patient was given instructions and counseling regarding her condition or for health maintenance advice. Please see specific information pulled into the AVS if appropriate.

## 2025-03-04 NOTE — LETTER
March 4, 2025    Sofy Martines  [address]  [place] [address]    To whom it may concern:    Ms. Martines was seen in my clinic on 3/4/25 and diagnosed with an acute illness.  She may return as soon as Thursday or Friday or may need all the way to Monday depending on how her symptoms progress.                   Leonarda Orlando MD

## 2025-03-24 DIAGNOSIS — F41.1 GAD (GENERALIZED ANXIETY DISORDER): ICD-10-CM

## 2025-03-24 RX ORDER — BUPROPION HYDROCHLORIDE 150 MG/1
150 TABLET ORAL EVERY MORNING
Qty: 30 TABLET | Refills: 1 | Status: SHIPPED | OUTPATIENT
Start: 2025-03-24